# Patient Record
Sex: FEMALE | Race: WHITE | NOT HISPANIC OR LATINO | Employment: FULL TIME | URBAN - NONMETROPOLITAN AREA
[De-identification: names, ages, dates, MRNs, and addresses within clinical notes are randomized per-mention and may not be internally consistent; named-entity substitution may affect disease eponyms.]

---

## 2018-09-25 ENCOUNTER — HISTORICAL (OUTPATIENT)
Dept: ADMINISTRATIVE | Facility: HOSPITAL | Age: 43
End: 2018-09-25

## 2018-12-03 ENCOUNTER — HISTORICAL (OUTPATIENT)
Dept: ADMINISTRATIVE | Facility: HOSPITAL | Age: 43
End: 2018-12-03

## 2019-05-31 ENCOUNTER — HISTORICAL (OUTPATIENT)
Dept: ADMINISTRATIVE | Facility: HOSPITAL | Age: 44
End: 2019-05-31

## 2019-09-13 ENCOUNTER — HISTORICAL (OUTPATIENT)
Dept: ADMINISTRATIVE | Facility: HOSPITAL | Age: 44
End: 2019-09-13

## 2020-10-14 ENCOUNTER — HISTORICAL (OUTPATIENT)
Dept: ADMINISTRATIVE | Facility: HOSPITAL | Age: 45
End: 2020-10-14

## 2021-05-19 LAB
CHOLEST SERPL-MCNC: 180 MG/DL (ref 0–200)
EST. AVERAGE GLUCOSE BLD GHB EST-MCNC: 105 MG/DL (ref 70–115)
FOLATE SERPL-MCNC: 3.88 NG/ML (ref 2.76–20)
HBA1C MFR BLD: 5.5 % (ref 4–6)
HDLC SERPL-MCNC: 50 MG/DL (ref 40–60)
LDLC SERPL CALC-MCNC: 98.7 MG/DL (ref 30–100)
TRIGL SERPL-MCNC: 87 MG/DL (ref 30–200)
VIT B12 SERPL-MCNC: 276 PG/ML (ref 211–946)

## 2021-06-07 ENCOUNTER — HISTORICAL (OUTPATIENT)
Dept: ADMINISTRATIVE | Facility: HOSPITAL | Age: 46
End: 2021-06-07

## 2021-12-15 LAB
ALBUMIN SERPL-MCNC: 4.2 G/DL (ref 3.4–5)
ALBUMIN/GLOB SERPL: 2 {RATIO}
ALP SERPL-CCNC: 57 U/L (ref 50–144)
ALT SERPL-CCNC: 13 U/L (ref 1–45)
ANION GAP SERPL CALC-SCNC: 2 MMOL/L (ref 2–13)
AST SERPL-CCNC: 18 U/L (ref 14–36)
BASOPHILS # BLD AUTO: 0.05 X10(3)/MCL (ref 0.01–0.08)
BASOPHILS NFR BLD AUTO: 0.7 % (ref 0.1–1.2)
BILIRUB SERPL-MCNC: 0.49 MG/DL (ref 0–1)
BUN SERPL-MCNC: 17 MG/DL (ref 7–20)
CALCIUM SERPL-MCNC: 9.3 MG/DL (ref 8.4–10.2)
CHLORIDE SERPL-SCNC: 106 MMOL/L (ref 94–110)
CO2 SERPL-SCNC: 27 MMOL/L (ref 21–32)
CREAT SERPL-MCNC: 1.06 MG/DL (ref 0.52–1.04)
CREAT/UREA NIT SERPL: 16 (ref 12–20)
EOSINOPHIL # BLD AUTO: 0.09 X10(3)/MCL (ref 0.04–0.36)
EOSINOPHIL NFR BLD AUTO: 1.2 % (ref 0.7–7)
ERYTHROCYTE [DISTWIDTH] IN BLOOD BY AUTOMATED COUNT: 12.2 % (ref 11–14.5)
GLOBULIN SER-MCNC: 2.1 G/DL (ref 2–3.9)
GLUCOSE SERPL-MCNC: 89 MGM./DL (ref 70–115)
HCT VFR BLD AUTO: 40 % (ref 36–48)
HGB BLD-MCNC: 13.5 G/DL (ref 11.8–16)
IMM GRANULOCYTES # BLD AUTO: 0.01 X10E3/UL (ref 0–0.03)
IMM GRANULOCYTES NFR BLD AUTO: 0.1 % (ref 0–0.5)
LYMPHOCYTES # BLD AUTO: 2.57 X10(3)/MCL (ref 1.16–3.74)
LYMPHOCYTES NFR BLD AUTO: 35.4 % (ref 20–55)
MCH RBC QN AUTO: 30.4 PG (ref 27–34)
MCHC RBC AUTO-ENTMCNC: 33.8 G/DL (ref 31–37)
MCV RBC AUTO: 90.1 FL (ref 79–99)
MONOCYTES # BLD AUTO: 0.41 X10(3)/MCL (ref 0.24–0.36)
MONOCYTES NFR BLD AUTO: 5.6 % (ref 4.7–12.5)
NEUTROPHILS # BLD AUTO: 4.14 X10(3)/MCL (ref 1.56–6.13)
NEUTROPHILS NFR BLD AUTO: 57 % (ref 37–73)
PLATELET # BLD AUTO: 192 X10(3)/MCL (ref 140–371)
PMV BLD AUTO: 12.2 FL (ref 9.4–12.4)
POTASSIUM SERPL-SCNC: 4.6 MMOL/L (ref 3.5–5.1)
PROT SERPL-MCNC: 6.3 G/DL (ref 6.3–8.2)
RBC # BLD AUTO: 4.44 X10(6)/MCL (ref 4–5.1)
SODIUM SERPL-SCNC: 135 MMOL/L (ref 135–145)
WBC # SPEC AUTO: 7.3 X10(3)/MCL (ref 4–11.5)

## 2022-04-10 ENCOUNTER — HISTORICAL (OUTPATIENT)
Dept: ADMINISTRATIVE | Facility: HOSPITAL | Age: 47
End: 2022-04-10

## 2022-04-26 VITALS
DIASTOLIC BLOOD PRESSURE: 80 MMHG | SYSTOLIC BLOOD PRESSURE: 130 MMHG | HEIGHT: 60 IN | WEIGHT: 254 LBS | BODY MASS INDEX: 49.87 KG/M2 | OXYGEN SATURATION: 99 %

## 2022-05-02 ENCOUNTER — HISTORICAL (OUTPATIENT)
Dept: ADMINISTRATIVE | Facility: HOSPITAL | Age: 47
End: 2022-05-02

## 2022-08-22 ENCOUNTER — HISTORICAL (OUTPATIENT)
Dept: ADMINISTRATIVE | Facility: HOSPITAL | Age: 47
End: 2022-08-22

## 2022-11-07 LAB
HUMAN PAPILLOMAVIRUS (HPV): NORMAL
PAP RECOMMENDATION EXT: NORMAL
PAP SMEAR: NORMAL

## 2022-12-07 LAB — NONINV COLON CA DNA+OCC BLD SCRN STL QL: NEGATIVE

## 2023-02-02 ENCOUNTER — DOCUMENTATION ONLY (OUTPATIENT)
Dept: ADMINISTRATIVE | Facility: HOSPITAL | Age: 48
End: 2023-02-02
Payer: MEDICAID

## 2023-06-19 ENCOUNTER — OFFICE VISIT (OUTPATIENT)
Dept: FAMILY MEDICINE | Facility: CLINIC | Age: 48
End: 2023-06-19
Payer: MEDICAID

## 2023-06-19 VITALS
HEIGHT: 60 IN | WEIGHT: 182.75 LBS | BODY MASS INDEX: 35.88 KG/M2 | HEART RATE: 68 BPM | SYSTOLIC BLOOD PRESSURE: 100 MMHG | TEMPERATURE: 99 F | OXYGEN SATURATION: 96 % | DIASTOLIC BLOOD PRESSURE: 62 MMHG

## 2023-06-19 DIAGNOSIS — M54.2 NECK PAIN: Primary | ICD-10-CM

## 2023-06-19 DIAGNOSIS — N62 LARGE BREASTS: ICD-10-CM

## 2023-06-19 DIAGNOSIS — M62.838 TRAPEZIUS MUSCLE SPASM: ICD-10-CM

## 2023-06-19 PROBLEM — F32.A DEPRESSIVE DISORDER: Status: ACTIVE | Noted: 2023-06-19

## 2023-06-19 PROBLEM — G25.81 RESTLESS LEGS SYNDROME: Status: ACTIVE | Noted: 2023-06-19

## 2023-06-19 PROBLEM — F41.1 GENERALIZED ANXIETY DISORDER: Status: ACTIVE | Noted: 2023-06-19

## 2023-06-19 PROBLEM — E66.01 MORBID OBESITY: Status: ACTIVE | Noted: 2023-06-19

## 2023-06-19 PROBLEM — F33.2 SEVERE EPISODE OF RECURRENT MAJOR DEPRESSIVE DISORDER: Status: ACTIVE | Noted: 2023-06-19

## 2023-06-19 PROBLEM — G47.30 SLEEP APNEA: Status: ACTIVE | Noted: 2023-06-19

## 2023-06-19 PROBLEM — M17.11 OSTEOARTHRITIS OF RIGHT KNEE: Status: ACTIVE | Noted: 2023-06-19

## 2023-06-19 PROBLEM — Z00.01 ABNORMAL PHYSICAL EVALUATION: Status: ACTIVE | Noted: 2023-06-19

## 2023-06-19 PROBLEM — J30.2 SEASONAL ALLERGIES: Status: ACTIVE | Noted: 2023-06-19

## 2023-06-19 PROCEDURE — 3008F PR BODY MASS INDEX (BMI) DOCUMENTED: ICD-10-PCS | Mod: CPTII,,, | Performed by: NURSE PRACTITIONER

## 2023-06-19 PROCEDURE — 99214 PR OFFICE/OUTPT VISIT, EST, LEVL IV, 30-39 MIN: ICD-10-PCS | Mod: ,,, | Performed by: NURSE PRACTITIONER

## 2023-06-19 PROCEDURE — 3074F PR MOST RECENT SYSTOLIC BLOOD PRESSURE < 130 MM HG: ICD-10-PCS | Mod: CPTII,,, | Performed by: NURSE PRACTITIONER

## 2023-06-19 PROCEDURE — 3074F SYST BP LT 130 MM HG: CPT | Mod: CPTII,,, | Performed by: NURSE PRACTITIONER

## 2023-06-19 PROCEDURE — 3008F BODY MASS INDEX DOCD: CPT | Mod: CPTII,,, | Performed by: NURSE PRACTITIONER

## 2023-06-19 PROCEDURE — 3078F DIAST BP <80 MM HG: CPT | Mod: CPTII,,, | Performed by: NURSE PRACTITIONER

## 2023-06-19 PROCEDURE — 1159F MED LIST DOCD IN RCRD: CPT | Mod: CPTII,,, | Performed by: NURSE PRACTITIONER

## 2023-06-19 PROCEDURE — 3078F PR MOST RECENT DIASTOLIC BLOOD PRESSURE < 80 MM HG: ICD-10-PCS | Mod: CPTII,,, | Performed by: NURSE PRACTITIONER

## 2023-06-19 PROCEDURE — 99214 OFFICE O/P EST MOD 30 MIN: CPT | Mod: ,,, | Performed by: NURSE PRACTITIONER

## 2023-06-19 PROCEDURE — 1159F PR MEDICATION LIST DOCUMENTED IN MEDICAL RECORD: ICD-10-PCS | Mod: CPTII,,, | Performed by: NURSE PRACTITIONER

## 2023-06-19 RX ORDER — ATOMOXETINE 100 MG/1
100 CAPSULE ORAL DAILY
COMMUNITY
Start: 2023-05-30 | End: 2023-10-17 | Stop reason: SDUPTHER

## 2023-06-19 RX ORDER — ALBUTEROL SULFATE 90 UG/1
2 AEROSOL, METERED RESPIRATORY (INHALATION) EVERY 4 HOURS PRN
COMMUNITY
Start: 2023-05-30

## 2023-06-19 RX ORDER — CETIRIZINE HYDROCHLORIDE 10 MG/1
10 TABLET ORAL DAILY
COMMUNITY
Start: 2023-05-30

## 2023-06-19 RX ORDER — PRAMIPEXOLE DIHYDROCHLORIDE 0.5 MG/1
0.5 TABLET ORAL DAILY
COMMUNITY
Start: 2023-05-30 | End: 2024-01-25

## 2023-06-19 RX ORDER — DIVALPROEX SODIUM 500 MG/1
500 TABLET, FILM COATED, EXTENDED RELEASE ORAL 2 TIMES DAILY
COMMUNITY
Start: 2023-05-30 | End: 2023-12-12 | Stop reason: SDUPTHER

## 2023-06-19 RX ORDER — LUMATEPERONE 42 MG/1
1 CAPSULE ORAL DAILY
COMMUNITY
Start: 2023-05-30 | End: 2023-12-12

## 2023-06-19 RX ORDER — BUSPIRONE HYDROCHLORIDE 10 MG/1
10 TABLET ORAL 3 TIMES DAILY
COMMUNITY
Start: 2023-05-30 | End: 2023-12-12 | Stop reason: SDUPTHER

## 2023-06-19 RX ORDER — MONTELUKAST SODIUM 10 MG/1
10 TABLET ORAL DAILY
COMMUNITY
Start: 2023-05-30

## 2023-06-19 RX ORDER — DICLOFENAC SODIUM 75 MG/1
75 TABLET, DELAYED RELEASE ORAL 2 TIMES DAILY
COMMUNITY
Start: 2023-05-30 | End: 2023-09-20

## 2023-06-19 RX ORDER — TIZANIDINE 4 MG/1
4 TABLET ORAL EVERY 8 HOURS PRN
Qty: 30 TABLET | Refills: 0 | Status: SHIPPED | OUTPATIENT
Start: 2023-06-19 | End: 2023-06-29

## 2023-06-19 RX ORDER — DICLOFENAC SODIUM 10 MG/G
2 GEL TOPICAL 4 TIMES DAILY PRN
COMMUNITY
Start: 2023-05-30

## 2023-06-19 RX ORDER — BETAMETHASONE SODIUM PHOSPHATE AND BETAMETHASONE ACETATE 3; 3 MG/ML; MG/ML
6 INJECTION, SUSPENSION INTRA-ARTICULAR; INTRALESIONAL; INTRAMUSCULAR; SOFT TISSUE
Status: COMPLETED | OUTPATIENT
Start: 2023-06-19 | End: 2023-06-19

## 2023-06-19 RX ORDER — FLUTICASONE PROPIONATE 50 MCG
1 SPRAY, SUSPENSION (ML) NASAL 2 TIMES DAILY
COMMUNITY
Start: 2023-05-30

## 2023-06-19 RX ADMIN — BETAMETHASONE SODIUM PHOSPHATE AND BETAMETHASONE ACETATE 6 MG: 3; 3 INJECTION, SUSPENSION INTRA-ARTICULAR; INTRALESIONAL; INTRAMUSCULAR; SOFT TISSUE at 01:06

## 2023-06-19 NOTE — PROGRESS NOTES
Patient ID: Gema Eugene  : 1975     Chief Complaint: Neck Pain and Shoulder Pain    Allergies: Patient is allergic to grass pollen.     History of Present Illness:  The patient is a 48 y.o. White female who presents to clinic for evaluation and management with a chief complaint of Neck Pain and Shoulder Pain   Neck Pain   This is a recurrent problem. The current episode started more than 1 year ago. The problem has been gradually worsening. The pain is associated with nothing. The pain is present in the midline. The quality of the pain is described as aching, burning, cramping, shooting and stabbing. The pain is at a severity of 9/10. The pain is severe. The symptoms are aggravated by position and twisting. Stiffness is present All day. Pertinent negatives include no chest pain, leg pain, pain with swallowing, paresis, photophobia, syncope, trouble swallowing, visual change, weakness or weight loss. She has tried NSAIDs for the symptoms. The treatment provided mild relief.      Past Medical History:  has no past medical history on file.    Social History:  reports that she has been smoking cigarettes. She has been smoking an average of .5 packs per day. She has been exposed to tobacco smoke. She has never used smokeless tobacco.    Care Team: Patient Care Team:  JOSE Frye as PCP - General (Family Medicine)  Cathy Gold NP (Psychiatry)     Current Medications:  Current Outpatient Medications   Medication Instructions    albuterol (PROVENTIL/VENTOLIN HFA) 90 mcg/actuation inhaler 2 puffs, Inhalation, Every 4 hours PRN    atomoxetine (STRATTERA) 100 mg, Oral, Daily    busPIRone (BUSPAR) 10 mg, Oral, 3 times daily    CAPLYTA 42 mg Cap 1 capsule, Oral, Daily    cetirizine (ZYRTEC) 10 mg, Oral, Daily    diclofenac (VOLTAREN) 75 mg, Oral, 2 times daily    diclofenac sodium (VOLTAREN) 2 g, Topical (Top), 4 times daily PRN    divalproex 500 mg, Oral, 2 times daily    fluticasone propionate  (FLONASE) 50 mcg/actuation nasal spray 1 spray, Each Nostril, 2 times daily    montelukast (SINGULAIR) 10 mg, Oral, Daily    pramipexole (MIRAPEX) 0.5 mg, Oral, Daily       Review of Systems   Constitutional:  Negative for weight loss.   HENT:  Negative for trouble swallowing.    Eyes:  Negative for photophobia.   Cardiovascular:  Negative for chest pain and syncope.   Musculoskeletal:  Positive for neck pain. Negative for leg pain.   Neurological:  Negative for weakness.      Visit Vitals  /62 (BP Location: Right arm, Patient Position: Sitting)   Pulse 68   Temp 98.6 °F (37 °C) (Temporal)   Ht 5' (1.524 m)   Wt 82.9 kg (182 lb 12.2 oz)   LMP 06/13/2023   SpO2 96%   BMI 35.69 kg/m²       Physical Exam  Vitals and nursing note reviewed.   Constitutional:       Appearance: Normal appearance. She is obese.   HENT:      Head: Normocephalic and atraumatic.      Nose: Nose normal.      Mouth/Throat:      Mouth: Mucous membranes are moist.      Pharynx: Oropharynx is clear.   Eyes:      Extraocular Movements: Extraocular movements intact.      Conjunctiva/sclera: Conjunctivae normal.      Pupils: Pupils are equal, round, and reactive to light.   Cardiovascular:      Rate and Rhythm: Normal rate and regular rhythm.      Pulses: Normal pulses.      Heart sounds: No murmur heard.  Pulmonary:      Effort: Pulmonary effort is normal.      Breath sounds: Normal breath sounds.   Musculoskeletal:         General: Tenderness present. No swelling. Normal range of motion.      Cervical back: Normal range of motion and neck supple. Spasms and tenderness (negative spurlings) present. No swelling, edema, deformity, erythema, signs of trauma, lacerations, rigidity, torticollis, bony tenderness or crepitus. No pain with movement. Normal range of motion.   Skin:     General: Skin is warm and dry.      Capillary Refill: Capillary refill takes less than 2 seconds.   Neurological:      General: No focal deficit present.      Mental  Status: She is alert and oriented to person, place, and time.   Psychiatric:         Mood and Affect: Mood normal.         Judgment: Judgment normal.      Assessment & Plan:  1. Neck pain  Overview:  10/14/2020 cervical spine xray showed mild degenerative findings    Assessment & Plan:  Educated on need to avoid taking more than one NSAID at a time. May take 2 extra strength Tylenol TID as needed for pain. Educated that muscle relaxants can cause drowsiness. Patient states understanding. Will notify of xray results when available. Handouts for stretching exercises provided.      Orders:  -     X-Ray Cervical Spine 2 or 3 Views; Future; Expected date: 06/19/2023  -     tiZANidine (ZANAFLEX) 4 MG tablet; Take 1 tablet (4 mg total) by mouth every 8 (eight) hours as needed (muscle pain).  Dispense: 30 tablet; Refill: 0  -     betamethasone acetate-betamethasone sodium phosphate injection 6 mg    2. Large breasts  Assessment & Plan:  Has lost weight, down to 44DD, still interested in breast reduction to help with neck pains      3. Trapezius muscle spasm  -     betamethasone acetate-betamethasone sodium phosphate injection 6 mg         Future Appointments   Date Time Provider Department Center   8/18/2023  8:50 AM ROBERT, [facility] AMADEO Saucedo   8/21/2023  2:00 PM JOSE Frye       Follow up if symptoms worsen or fail to improve, for Keep Apt as Scheduled. Call sooner if needed.    JOSE FRYE

## 2023-06-19 NOTE — ASSESSMENT & PLAN NOTE
Educated on need to avoid taking more than one NSAID at a time. May take 2 extra strength Tylenol TID as needed for pain. Educated that muscle relaxants can cause drowsiness. Patient states understanding. Will notify of xray results when available. Handouts for stretching exercises provided.

## 2023-09-18 ENCOUNTER — TELEPHONE (OUTPATIENT)
Dept: FAMILY MEDICINE | Facility: CLINIC | Age: 48
End: 2023-09-18
Payer: MEDICAID

## 2023-09-18 PROBLEM — Z00.01 ABNORMAL PHYSICAL EVALUATION: Status: RESOLVED | Noted: 2023-06-19 | Resolved: 2023-09-18

## 2023-09-20 DIAGNOSIS — M17.11 OSTEOARTHRITIS OF RIGHT KNEE, UNSPECIFIED OSTEOARTHRITIS TYPE: Primary | ICD-10-CM

## 2023-09-20 RX ORDER — DICLOFENAC SODIUM 75 MG/1
TABLET, DELAYED RELEASE ORAL
Qty: 60 TABLET | Refills: 11 | Status: SHIPPED | OUTPATIENT
Start: 2023-09-20 | End: 2024-01-18

## 2023-10-17 ENCOUNTER — HOSPITAL ENCOUNTER (OUTPATIENT)
Dept: RADIOLOGY | Facility: HOSPITAL | Age: 48
Discharge: HOME | End: 2023-10-17
Attending: NURSE PRACTITIONER
Payer: MEDICAID

## 2023-10-17 ENCOUNTER — PATIENT MESSAGE (OUTPATIENT)
Dept: FAMILY MEDICINE | Facility: CLINIC | Age: 48
End: 2023-10-17

## 2023-10-17 ENCOUNTER — OFFICE VISIT (OUTPATIENT)
Dept: FAMILY MEDICINE | Facility: CLINIC | Age: 48
End: 2023-10-17
Payer: MEDICAID

## 2023-10-17 VITALS
HEART RATE: 86 BPM | BODY MASS INDEX: 34.68 KG/M2 | DIASTOLIC BLOOD PRESSURE: 60 MMHG | WEIGHT: 176.63 LBS | HEIGHT: 60 IN | TEMPERATURE: 98 F | SYSTOLIC BLOOD PRESSURE: 118 MMHG | OXYGEN SATURATION: 98 %

## 2023-10-17 DIAGNOSIS — Z23 IMMUNIZATION DUE: ICD-10-CM

## 2023-10-17 DIAGNOSIS — M54.2 NECK PAIN: ICD-10-CM

## 2023-10-17 DIAGNOSIS — M25.511 ACUTE PAIN OF RIGHT SHOULDER: Primary | ICD-10-CM

## 2023-10-17 DIAGNOSIS — R41.840 DIFFICULTY CONCENTRATING: ICD-10-CM

## 2023-10-17 DIAGNOSIS — Z12.31 SCREENING MAMMOGRAM FOR BREAST CANCER: ICD-10-CM

## 2023-10-17 PROCEDURE — 1159F PR MEDICATION LIST DOCUMENTED IN MEDICAL RECORD: ICD-10-PCS | Mod: CPTII,,, | Performed by: NURSE PRACTITIONER

## 2023-10-17 PROCEDURE — 90686 FLU VACCINE (QUAD) GREATER THAN OR EQUAL TO 3YO PRESERVATIVE FREE IM: ICD-10-PCS | Mod: ,,, | Performed by: NURSE PRACTITIONER

## 2023-10-17 PROCEDURE — 73030 X-RAY EXAM OF SHOULDER: CPT | Mod: TC,RT

## 2023-10-17 PROCEDURE — 3008F BODY MASS INDEX DOCD: CPT | Mod: CPTII,,, | Performed by: NURSE PRACTITIONER

## 2023-10-17 PROCEDURE — 1160F PR REVIEW ALL MEDS BY PRESCRIBER/CLIN PHARMACIST DOCUMENTED: ICD-10-PCS | Mod: CPTII,,, | Performed by: NURSE PRACTITIONER

## 2023-10-17 PROCEDURE — 90471 IMMUNIZATION ADMIN: CPT | Mod: ,,, | Performed by: NURSE PRACTITIONER

## 2023-10-17 PROCEDURE — 3074F SYST BP LT 130 MM HG: CPT | Mod: CPTII,,, | Performed by: NURSE PRACTITIONER

## 2023-10-17 PROCEDURE — 90471 FLU VACCINE (QUAD) GREATER THAN OR EQUAL TO 3YO PRESERVATIVE FREE IM: ICD-10-PCS | Mod: ,,, | Performed by: NURSE PRACTITIONER

## 2023-10-17 PROCEDURE — 99214 PR OFFICE/OUTPT VISIT, EST, LEVL IV, 30-39 MIN: ICD-10-PCS | Mod: 25,,, | Performed by: NURSE PRACTITIONER

## 2023-10-17 PROCEDURE — 3008F PR BODY MASS INDEX (BMI) DOCUMENTED: ICD-10-PCS | Mod: CPTII,,, | Performed by: NURSE PRACTITIONER

## 2023-10-17 PROCEDURE — 3078F PR MOST RECENT DIASTOLIC BLOOD PRESSURE < 80 MM HG: ICD-10-PCS | Mod: CPTII,,, | Performed by: NURSE PRACTITIONER

## 2023-10-17 PROCEDURE — 1160F RVW MEDS BY RX/DR IN RCRD: CPT | Mod: CPTII,,, | Performed by: NURSE PRACTITIONER

## 2023-10-17 PROCEDURE — 99214 OFFICE O/P EST MOD 30 MIN: CPT | Mod: 25,,, | Performed by: NURSE PRACTITIONER

## 2023-10-17 PROCEDURE — 3078F DIAST BP <80 MM HG: CPT | Mod: CPTII,,, | Performed by: NURSE PRACTITIONER

## 2023-10-17 PROCEDURE — 72040 X-RAY EXAM NECK SPINE 2-3 VW: CPT | Mod: TC

## 2023-10-17 PROCEDURE — 3074F PR MOST RECENT SYSTOLIC BLOOD PRESSURE < 130 MM HG: ICD-10-PCS | Mod: CPTII,,, | Performed by: NURSE PRACTITIONER

## 2023-10-17 PROCEDURE — 1159F MED LIST DOCD IN RCRD: CPT | Mod: CPTII,,, | Performed by: NURSE PRACTITIONER

## 2023-10-17 PROCEDURE — 90686 IIV4 VACC NO PRSV 0.5 ML IM: CPT | Mod: ,,, | Performed by: NURSE PRACTITIONER

## 2023-10-17 RX ORDER — ATOMOXETINE 100 MG/1
100 CAPSULE ORAL DAILY
Qty: 30 CAPSULE | Refills: 0 | Status: SHIPPED | OUTPATIENT
Start: 2023-10-17 | End: 2023-12-12 | Stop reason: SDUPTHER

## 2023-10-17 NOTE — ASSESSMENT & PLAN NOTE
Schedule next available new patient apt with Rod per patient request d/t difficulty getting to . Refill strattera for now

## 2023-10-17 NOTE — PROGRESS NOTES
Patient ID: Gema Eugene  : 1975     Chief Complaint: Shoulder Pain (Rt shoulder pain, congestion)    Allergies: Patient is allergic to grass pollen.     History of Present Illness:  The patient is a 48 y.o. White female who presents to clinic for evaluation and management with a chief complaint of Shoulder Pain (Rt shoulder pain, congestion)   Neck Pain   This is a recurrent problem. The current episode started more than 1 year ago. The problem has been gradually worsening. The pain is associated with nothing. The pain is present in the midline. The quality of the pain is described as aching, burning, cramping, shooting and stabbing. The pain is at a severity of 9/10. The pain is severe. The symptoms are aggravated by position and twisting. Stiffness is present All day. Pertinent negatives include no chest pain, leg pain, pain with swallowing, paresis, photophobia, syncope, trouble swallowing, visual change, weakness or weight loss. She has tried NSAIDs for the symptoms. The treatment provided mild relief.       Shoulder Pain   The pain is present in the right shoulder, right arm and neck. This is a recurrent problem. The current episode started more than 1 month ago. There has been a history of trauma. The problem occurs constantly. The problem has been gradually worsening. The quality of the pain is described as burning and aching. The pain is at a severity of 9/10. Associated symptoms include a limited range of motion, numbness, stiffness and tingling. Pertinent negatives include no fever, headaches, inability to bear weight, itching, joint locking, joint swelling or visual symptoms. The symptoms are aggravated by cold. She has tried NSAIDS and rest for the symptoms. The treatment provided mild relief.   Neck Pain   This is a recurrent problem. The current episode started more than 1 year ago. The problem has been gradually worsening. The pain is associated with nothing. The pain is present in the  midline. The quality of the pain is described as aching, burning, cramping, shooting and stabbing. The pain is at a severity of 9/10. The pain is severe. The symptoms are aggravated by position and twisting. Stiffness is present All day. Associated symptoms include numbness and tingling. Pertinent negatives include no chest pain, fever, headaches, leg pain, pain with swallowing, paresis, photophobia, syncope, trouble swallowing, visual change, weakness or weight loss. She has tried NSAIDs for the symptoms. The treatment provided mild relief.        Past Medical History:  has a past medical history of Arthritis.    Social History:  reports that she has been smoking cigarettes. She has been exposed to tobacco smoke. She has never used smokeless tobacco. She reports that she does not currently use alcohol. She reports current drug use. Drug: Marijuana.    Care Team: Patient Care Team:  Avinash Salazar APRN as PCP - General (Family Medicine)  Cathy Gold, NP (Psychiatry)     Current Medications:  Current Outpatient Medications   Medication Instructions    albuterol (PROVENTIL/VENTOLIN HFA) 90 mcg/actuation inhaler 2 puffs, Inhalation, Every 4 hours PRN    atomoxetine (STRATTERA) 100 mg, Oral, Daily    busPIRone (BUSPAR) 10 mg, Oral, 3 times daily    CAPLYTA 42 mg Cap 1 capsule, Oral, Daily    cetirizine (ZYRTEC) 10 mg, Oral, Daily    diclofenac (VOLTAREN) 75 MG EC tablet TAKE ONE(1) TAB BY MOUTH TWICE DAILY WITH FOOD FOR PAIN & INFLAMMATION    diclofenac sodium (VOLTAREN) 2 g, Topical (Top), 4 times daily PRN    divalproex 500 mg, Oral, 2 times daily    fluticasone propionate (FLONASE) 50 mcg/actuation nasal spray 1 spray, Each Nostril, 2 times daily    montelukast (SINGULAIR) 10 mg, Oral, Daily    pramipexole (MIRAPEX) 0.5 mg, Oral, Daily       Review of Systems   Constitutional:  Negative for fever and weight loss.   HENT:  Negative for mouth sores, sore throat and trouble swallowing.    Eyes:  Negative for  photophobia.   Respiratory:  Negative for shortness of breath.    Cardiovascular:  Negative for chest pain and syncope.   Musculoskeletal:  Positive for arthralgias, neck pain and stiffness. Negative for leg pain.   Integumentary:  Negative for itching.   Neurological:  Positive for tingling and numbness. Negative for weakness and headaches.   Psychiatric/Behavioral:  Positive for decreased concentration.         Visit Vitals  /60 (BP Location: Left arm)   Pulse 86   Temp 98 °F (36.7 °C) (Oral)   Ht 5' (1.524 m)   Wt 80.1 kg (176 lb 9.6 oz)   LMP 09/17/2023   SpO2 98%   BMI 34.49 kg/m²       Physical Exam  Vitals and nursing note reviewed.   Constitutional:       Appearance: Normal appearance. She is obese.   HENT:      Head: Normocephalic and atraumatic.      Nose: Nose normal.      Mouth/Throat:      Mouth: Mucous membranes are moist.      Pharynx: Oropharynx is clear.   Eyes:      Extraocular Movements: Extraocular movements intact.      Conjunctiva/sclera: Conjunctivae normal.      Pupils: Pupils are equal, round, and reactive to light.   Cardiovascular:      Rate and Rhythm: Normal rate and regular rhythm.      Pulses: Normal pulses.      Heart sounds: No murmur heard.  Pulmonary:      Effort: Pulmonary effort is normal.      Breath sounds: Normal breath sounds.   Musculoskeletal:         General: Tenderness present. No swelling.      Right shoulder: Tenderness, bony tenderness and crepitus present. No swelling or deformity. Decreased range of motion.      Cervical back: Normal range of motion and neck supple. Spasms and tenderness (negative spurlings, + tenderness with palpation of trapezius) present. No swelling, edema, deformity, erythema, signs of trauma, lacerations, rigidity, torticollis, bony tenderness or crepitus. No pain with movement. Normal range of motion.      Comments: + empty can produces pain and weakness, + lift off.    Skin:     General: Skin is warm and dry.      Capillary Refill:  Capillary refill takes less than 2 seconds.   Neurological:      General: No focal deficit present.      Mental Status: She is alert and oriented to person, place, and time.   Psychiatric:         Mood and Affect: Mood normal.         Judgment: Judgment normal.          Labs Reviewed:  Chemistry:  Lab Results   Component Value Date     12/15/2021    K 4.6 12/15/2021    CHLORIDE 106 12/15/2021    BUN 17 12/15/2021    CREATININE 1.06 (H) 12/15/2021    GLUCOSE 89 12/15/2021    CALCIUM 9.3 12/15/2021    ALKPHOS 57 12/15/2021    LABPROT 6.3 12/15/2021    ALBUMIN 4.2 12/15/2021    AST 18 12/15/2021    ALT 13 12/15/2021        Lab Results   Component Value Date    HGBA1C 5.5 05/19/2021        Hematology:  Lab Results   Component Value Date    WBC 7.3 12/15/2021    RBC 4.44 12/15/2021    HGB 13.5 12/15/2021    HCT 40.0 12/15/2021    MCV 90.1 12/15/2021    MCH 30.4 12/15/2021    MCHC 33.8 12/15/2021    RDW 12.2 (H) 12/15/2021     12/15/2021    MPV 12.2 12/15/2021       Lipid Panel:  Lab Results   Component Value Date    CHOL 180 05/19/2021    HDL 50 05/19/2021    TRIG 87 05/19/2021        Assessment & Plan:  1. Acute pain of right shoulder  -     X-ray Shoulder 2 or More Views Right; Future; Expected date: 10/17/2023  -     Ambulatory referral/consult to Physical/Occupational Therapy; Future; Expected date: 10/24/2023    2. Neck pain  Overview:  10/14/2020 cervical spine xray showed mild degenerative findings    Orders:  -     X-Ray Cervical Spine AP And Lateral; Future; Expected date: 10/17/2023  -     Ambulatory referral/consult to Physical/Occupational Therapy; Future; Expected date: 10/24/2023    3. Screening mammogram for breast cancer  -     Mammo Digital Screening Bilat w/ Michael; Future; Expected date: 10/17/2023    4. Immunization due  -     Influenza - Quadrivalent *Preferred* (6 months+) (PF)    5. Difficulty concentrating  Assessment & Plan:  Schedule next available new patient apt with Rod bland  patient request d/t difficulty getting to . Refill strattera for now    Orders:  -     atomoxetine (STRATTERA) 100 mg capsule; Take 1 capsule (100 mg total) by mouth Daily.  Dispense: 30 capsule; Refill: 0         Future Appointments   Date Time Provider Department Center   12/18/2023 10:00 AM Avinash Salazar APRN [facility] [place] [facility]       Follow up for 1) next available new pt Rod  2) needs lab apt prior to dec apt. Call sooner if needed.    JOSE GRANT    Lab Frequency Next Occurrence   X-Ray Cervical Spine 2 or 3 Views Once 06/19/2023   CBC Auto Differential Once 08/18/2023   Comprehensive Metabolic Panel Once 08/18/2023   Lipid Panel Once 08/18/2023   TSH Once 08/18/2023   Vitamin D Once 08/18/2023

## 2023-12-12 ENCOUNTER — OFFICE VISIT (OUTPATIENT)
Dept: BEHAVIORAL HEALTH | Facility: CLINIC | Age: 48
End: 2023-12-12
Payer: MEDICAID

## 2023-12-12 VITALS
OXYGEN SATURATION: 95 % | WEIGHT: 179.69 LBS | SYSTOLIC BLOOD PRESSURE: 122 MMHG | TEMPERATURE: 98 F | BODY MASS INDEX: 35.28 KG/M2 | HEART RATE: 88 BPM | DIASTOLIC BLOOD PRESSURE: 76 MMHG | HEIGHT: 60 IN

## 2023-12-12 DIAGNOSIS — F33.2 SEVERE EPISODE OF RECURRENT MAJOR DEPRESSIVE DISORDER, WITHOUT PSYCHOTIC FEATURES: ICD-10-CM

## 2023-12-12 DIAGNOSIS — F31.9 BIPOLAR AFFECTIVE DISORDER, REMISSION STATUS UNSPECIFIED: Primary | ICD-10-CM

## 2023-12-12 DIAGNOSIS — R41.840 DIFFICULTY CONCENTRATING: ICD-10-CM

## 2023-12-12 DIAGNOSIS — F12.90 MARIJUANA USE, CONTINUOUS: ICD-10-CM

## 2023-12-12 DIAGNOSIS — Z91.51 HISTORY OF SUICIDE ATTEMPT: ICD-10-CM

## 2023-12-12 DIAGNOSIS — F41.1 GENERALIZED ANXIETY DISORDER: ICD-10-CM

## 2023-12-12 DIAGNOSIS — F19.90 SUBSTANCE USE DISORDER: ICD-10-CM

## 2023-12-12 DIAGNOSIS — Z02.83 ENCOUNTER FOR DRUG SCREENING: ICD-10-CM

## 2023-12-12 DIAGNOSIS — F15.10 METHAMPHETAMINE ABUSE: ICD-10-CM

## 2023-12-12 DIAGNOSIS — Z86.59 HISTORY OF PSYCHIATRIC HOSPITALIZATION: ICD-10-CM

## 2023-12-12 LAB
AMP AMPHETAMINE 1000 NM/ML POC: ABNORMAL
BAR BARBITURATES 300 NG/ML POC: NEGATIVE
BUP BUPRENORPHINE 10 NG/ML POC: NEGATIVE
BZO BENZODIAZEPINES 300 NG/ML POC: NEGATIVE
COC COCAINE 300 NG/ML POC: NEGATIVE
CREATININE (CR) POC: ABNORMAL
CTP QC/QA: YES
MET METHAMPHETAMINE 1000 NG/ML POC: ABNORMAL
MOP/OPI300 MORPHINE 300 NG/ML POC: NEGATIVE
MTD METHADONE 300 NG/ML POC: NEGATIVE
OXIDANT (OX) POC: ABNORMAL
OXY OXYCODONE 100 NG/ML POC: NEGATIVE
SPECIFIC GRAVITY (SG) POC: ABNORMAL
TEMPERATURE (°F) POC: 96
THC MARIJUANA 50 NG/ML POC: ABNORMAL

## 2023-12-12 PROCEDURE — 99204 PR OFFICE/OUTPT VISIT, NEW, LEVL IV, 45-59 MIN: ICD-10-PCS | Mod: 25,,, | Performed by: NURSE PRACTITIONER

## 2023-12-12 PROCEDURE — 80305 POCT URINE DRUG SCREEN (WITH BUP): ICD-10-PCS | Mod: QW,,, | Performed by: NURSE PRACTITIONER

## 2023-12-12 PROCEDURE — 99204 OFFICE O/P NEW MOD 45 MIN: CPT | Mod: 25,,, | Performed by: NURSE PRACTITIONER

## 2023-12-12 PROCEDURE — 3078F PR MOST RECENT DIASTOLIC BLOOD PRESSURE < 80 MM HG: ICD-10-PCS | Mod: CPTII,,, | Performed by: NURSE PRACTITIONER

## 2023-12-12 PROCEDURE — 3078F DIAST BP <80 MM HG: CPT | Mod: CPTII,,, | Performed by: NURSE PRACTITIONER

## 2023-12-12 PROCEDURE — 3074F PR MOST RECENT SYSTOLIC BLOOD PRESSURE < 130 MM HG: ICD-10-PCS | Mod: CPTII,,, | Performed by: NURSE PRACTITIONER

## 2023-12-12 PROCEDURE — 1160F PR REVIEW ALL MEDS BY PRESCRIBER/CLIN PHARMACIST DOCUMENTED: ICD-10-PCS | Mod: CPTII,,, | Performed by: NURSE PRACTITIONER

## 2023-12-12 PROCEDURE — 80305 DRUG TEST PRSMV DIR OPT OBS: CPT | Mod: QW,,, | Performed by: NURSE PRACTITIONER

## 2023-12-12 PROCEDURE — 1160F RVW MEDS BY RX/DR IN RCRD: CPT | Mod: CPTII,,, | Performed by: NURSE PRACTITIONER

## 2023-12-12 PROCEDURE — 3008F BODY MASS INDEX DOCD: CPT | Mod: CPTII,,, | Performed by: NURSE PRACTITIONER

## 2023-12-12 PROCEDURE — 1159F MED LIST DOCD IN RCRD: CPT | Mod: CPTII,,, | Performed by: NURSE PRACTITIONER

## 2023-12-12 PROCEDURE — 3074F SYST BP LT 130 MM HG: CPT | Mod: CPTII,,, | Performed by: NURSE PRACTITIONER

## 2023-12-12 PROCEDURE — 3008F PR BODY MASS INDEX (BMI) DOCUMENTED: ICD-10-PCS | Mod: CPTII,,, | Performed by: NURSE PRACTITIONER

## 2023-12-12 PROCEDURE — 1159F PR MEDICATION LIST DOCUMENTED IN MEDICAL RECORD: ICD-10-PCS | Mod: CPTII,,, | Performed by: NURSE PRACTITIONER

## 2023-12-12 RX ORDER — ATOMOXETINE 100 MG/1
100 CAPSULE ORAL DAILY
Qty: 30 CAPSULE | Refills: 0 | Status: SHIPPED | OUTPATIENT
Start: 2023-12-12 | End: 2024-01-18 | Stop reason: SDUPTHER

## 2023-12-12 RX ORDER — BUSPIRONE HYDROCHLORIDE 10 MG/1
10 TABLET ORAL 3 TIMES DAILY
Qty: 90 TABLET | Refills: 0 | Status: SHIPPED | OUTPATIENT
Start: 2023-12-12 | End: 2024-01-18 | Stop reason: SDUPTHER

## 2023-12-12 RX ORDER — DIVALPROEX SODIUM 500 MG/1
500 TABLET, FILM COATED, EXTENDED RELEASE ORAL 2 TIMES DAILY
Qty: 60 TABLET | Refills: 0 | Status: SHIPPED | OUTPATIENT
Start: 2023-12-12
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
## 2024-01-18 ENCOUNTER — TELEPHONE (OUTPATIENT)
Dept: FAMILY MEDICINE | Facility: CLINIC | Age: 49
End: 2024-01-18

## 2024-01-18 ENCOUNTER — PATIENT MESSAGE (OUTPATIENT)
Dept: FAMILY MEDICINE | Facility: CLINIC | Age: 49
End: 2024-01-18

## 2024-01-18 ENCOUNTER — OFFICE VISIT (OUTPATIENT)
Dept: FAMILY MEDICINE | Facility: CLINIC | Age: 49
End: 2024-01-18
Payer: MEDICAID

## 2024-01-18 VITALS
OXYGEN SATURATION: 98 % | DIASTOLIC BLOOD PRESSURE: 74 MMHG | BODY MASS INDEX: 34.43 KG/M2 | HEART RATE: 88 BPM | WEIGHT: 175.38 LBS | HEIGHT: 60 IN | SYSTOLIC BLOOD PRESSURE: 122 MMHG | TEMPERATURE: 98 F

## 2024-01-18 DIAGNOSIS — M25.512 ACUTE PAIN OF LEFT SHOULDER: Primary | ICD-10-CM

## 2024-01-18 DIAGNOSIS — R41.840 DIFFICULTY CONCENTRATING: ICD-10-CM

## 2024-01-18 DIAGNOSIS — F41.1 GENERALIZED ANXIETY DISORDER: ICD-10-CM

## 2024-01-18 PROCEDURE — 3078F DIAST BP <80 MM HG: CPT | Mod: CPTII,,, | Performed by: NURSE PRACTITIONER

## 2024-01-18 PROCEDURE — 3008F BODY MASS INDEX DOCD: CPT | Mod: CPTII,,, | Performed by: NURSE PRACTITIONER

## 2024-01-18 PROCEDURE — 1159F MED LIST DOCD IN RCRD: CPT | Mod: CPTII,,, | Performed by: NURSE PRACTITIONER

## 2024-01-18 PROCEDURE — 99213 OFFICE O/P EST LOW 20 MIN: CPT | Mod: ,,, | Performed by: NURSE PRACTITIONER

## 2024-01-18 PROCEDURE — 1160F RVW MEDS BY RX/DR IN RCRD: CPT | Mod: CPTII,,, | Performed by: NURSE PRACTITIONER

## 2024-01-18 PROCEDURE — 3074F SYST BP LT 130 MM HG: CPT | Mod: CPTII,,, | Performed by: NURSE PRACTITIONER

## 2024-01-18 RX ORDER — BUSPIRONE HYDROCHLORIDE 10 MG/1
10 TABLET ORAL 3 TIMES DAILY
Qty: 90 TABLET | Refills: 0 | Status: SHIPPED | OUTPATIENT
Start: 2024-01-18 | End: 2024-05-07 | Stop reason: SDUPTHER

## 2024-01-18 RX ORDER — ATOMOXETINE 100 MG/1
100 CAPSULE ORAL DAILY
Qty: 30 CAPSULE | Refills: 0 | Status: SHIPPED | OUTPATIENT
Start: 2024-01-18 | End: 2024-05-07 | Stop reason: SDUPTHER

## 2024-01-18 RX ORDER — IBUPROFEN 800 MG/1
800 TABLET ORAL 3 TIMES DAILY
Qty: 90 TABLET | Refills: 1 | Status: SHIPPED | OUTPATIENT
Start: 2024-01-18 | End: 2024-04-06

## 2024-01-18 NOTE — PROGRESS NOTES
Patient ID: Gema Eugene  : 1975     Chief Complaint: Arthritis    Allergies: Patient is allergic to grass pollen.     History of Present Illness:  The patient is a 48 y.o. White female who presents to clinic for evaluation and management with a chief complaint of Arthritis   Right shoulder and neck pain began 3 years ago.  This is a recurrent problem. The current episode started more than 6 months ago. Xray of right shoulder showed mild osteoarthritis. Still awaiting apt for Physical therapy.     Now patient is c/o left shoulder pain. This began about 3 months ago. Located top of bicep and posterior shoulder. Worse with lifting above the head.     Arthritis  Presents for follow-up visit. She complains of pain and stiffness. The symptoms have been worsening. Affected locations include the right shoulder, left shoulder and neck. Associated symptoms include pain at night and pain while resting. Pertinent negatives include no diarrhea, dry eyes, dry mouth, dysuria, fatigue, fever, rash, Raynaud's syndrome, uveitis or weight loss.   Shoulder Pain   The pain is present in the right shoulder, right arm and neck. This is a recurrent problem. The current episode started more than 1 month ago. There has been a history of trauma. The problem occurs constantly. The problem has been gradually worsening. The quality of the pain is described as burning and aching. The pain is at a severity of 9/10. Associated symptoms include a limited range of motion, numbness, stiffness and tingling. Pertinent negatives include no fever, headaches, inability to bear weight, itching, joint locking, joint swelling or visual symptoms. The symptoms are aggravated by cold. She has tried NSAIDS and rest for the symptoms. The treatment provided mild relief.   Neck Pain   This is a recurrent problem. The current episode started more than 1 year ago. The problem has been gradually worsening. The pain is associated with nothing. The pain is  present in the midline. The quality of the pain is described as aching, burning, cramping, shooting and stabbing. The pain is at a severity of 9/10. The pain is severe. The symptoms are aggravated by position and twisting. Stiffness is present All day. Associated symptoms include numbness and tingling. Pertinent negatives include no chest pain, fever, headaches, leg pain, pain with swallowing, paresis, photophobia, syncope, trouble swallowing, visual change, weakness or weight loss. She has tried NSAIDs for the symptoms. The treatment provided mild relief.        Past Medical History:  has a past medical history of ADHD (attention deficit hyperactivity disorder), Anxiety, Arthritis, Bipolar disorder, and Depression.    Social History:  reports that she has been smoking cigarettes. She started smoking about 20 years ago. She has a 10.0 pack-year smoking history. She has been exposed to tobacco smoke. She has never used smokeless tobacco. She reports that she does not currently use alcohol. She reports current drug use. Drugs: Marijuana and Methamphetamines.    Care Team: Patient Care Team:  Avinash Salazar APRN as PCP - General (Family Medicine)  Cathy Gold, NP (Psychiatry)     Current Medications:  Current Outpatient Medications   Medication Instructions    albuterol (PROVENTIL/VENTOLIN HFA) 90 mcg/actuation inhaler 2 puffs, Inhalation, Every 4 hours PRN    atomoxetine (STRATTERA) 100 mg, Oral, Daily    busPIRone (BUSPAR) 10 mg, Oral, 3 times daily    cetirizine (ZYRTEC) 10 mg, Oral, Daily    diclofenac (VOLTAREN) 75 MG EC tablet TAKE ONE(1) TAB BY MOUTH TWICE DAILY WITH FOOD FOR PAIN & INFLAMMATION    diclofenac sodium (VOLTAREN) 2 g, Topical (Top), 4 times daily PRN    divalproex ER (DEPAKOTE ER) 500 mg, Oral, 2 times daily    fluticasone propionate (FLONASE) 50 mcg/actuation nasal spray 1 spray, Each Nostril, 2 times daily    montelukast (SINGULAIR) 10 mg, Oral, Daily    pramipexole (MIRAPEX) 0.5 mg,  Oral, Daily       Review of Systems   Constitutional:  Negative for fatigue, fever and weight loss.   HENT:  Negative for trouble swallowing.    Eyes:  Negative for photophobia.   Cardiovascular:  Negative for chest pain and syncope.   Gastrointestinal:  Negative for diarrhea.   Genitourinary:  Negative for dysuria.   Musculoskeletal:  Positive for arthritis, neck pain and stiffness. Negative for leg pain.   Integumentary:  Negative for itching and rash.   Neurological:  Positive for tingling and numbness. Negative for weakness and headaches.        Visit Vitals  /74 (BP Location: Right arm, Patient Position: Sitting, BP Method: Medium (Manual))   Pulse 88   Temp 97.9 °F (36.6 °C) (Temporal)   Ht 5' (1.524 m)   Wt 79.6 kg (175 lb 6.4 oz)   LMP 11/23/2023 (Approximate)   SpO2 98%   BMI 34.26 kg/m²       Physical Exam  Vitals and nursing note reviewed.   Constitutional:       Appearance: Normal appearance. She is obese.   HENT:      Head: Normocephalic and atraumatic.      Nose: Nose normal.      Mouth/Throat:      Mouth: Mucous membranes are moist.      Pharynx: Oropharynx is clear.   Eyes:      Extraocular Movements: Extraocular movements intact.      Conjunctiva/sclera: Conjunctivae normal.      Pupils: Pupils are equal, round, and reactive to light.   Cardiovascular:      Rate and Rhythm: Normal rate and regular rhythm.      Pulses: Normal pulses.      Heart sounds: No murmur heard.  Pulmonary:      Effort: Pulmonary effort is normal.      Breath sounds: Normal breath sounds.   Musculoskeletal:         General: Tenderness present. No swelling.      Left shoulder: Tenderness (with palpation of proximal bicep tendon) and bony tenderness present. No swelling, deformity or crepitus. Decreased range of motion.      Cervical back: Normal range of motion and neck supple.      Comments: (-) empty can, (-)  lift off, (-) drop arm   Skin:     General: Skin is warm and dry.      Capillary Refill: Capillary refill takes  less than 2 seconds.   Neurological:      General: No focal deficit present.      Mental Status: She is alert and oriented to person, place, and time.   Psychiatric:         Mood and Affect: Mood normal.         Judgment: Judgment normal.          Labs Reviewed:  Chemistry:  Lab Results   Component Value Date     12/15/2021    K 4.6 12/15/2021    CHLORIDE 106 12/15/2021    BUN 17 12/15/2021    CREATININE 1.06 (H) 12/15/2021    GLUCOSE 89 12/15/2021    CALCIUM 9.3 12/15/2021    ALKPHOS 57 12/15/2021    LABPROT 6.3 12/15/2021    ALBUMIN 4.2 12/15/2021    AST 18 12/15/2021    ALT 13 12/15/2021        Lab Results   Component Value Date    HGBA1C 5.5 05/19/2021        Hematology:  Lab Results   Component Value Date    WBC 7.3 12/15/2021    RBC 4.44 12/15/2021    HGB 13.5 12/15/2021    HCT 40.0 12/15/2021    MCV 90.1 12/15/2021    MCH 30.4 12/15/2021    MCHC 33.8 12/15/2021    RDW 12.2 (H) 12/15/2021     12/15/2021    MPV 12.2 12/15/2021       Lipid Panel:  Lab Results   Component Value Date    CHOL 180 05/19/2021    HDL 50 05/19/2021    TRIG 87 05/19/2021        Assessment & Plan:  1. Acute pain of left shoulder  Assessment & Plan:  Change diclofenac to ibuprofen. Advised on kt tape. Educated on need to avoid taking more than one NSAID at a time. May take 2 extra strength Tylenol TID as needed for pain.  Patient states understanding. Will notify of xray results when available. Handouts for stretching exercises provided.      Orders:  -     Ambulatory referral/consult to Physical/Occupational Therapy; Future; Expected date: 01/25/2024  -     X-Ray Shoulder 2 or More Views Left; Future; Expected date: 01/18/2024  -     ibuprofen (ADVIL,MOTRIN) 800 MG tablet; Take 1 tablet (800 mg total) by mouth 3 (three) times daily.  Dispense: 90 tablet; Refill: 1         Future Appointments   Date Time Provider Department Center   1/18/2024  8:30 AM Avinash Salazar APRN [facility]   1/23/2024 11:30 AM [place],  Graham REYES PMHNP [facility] Ethel Saucedo       No follow-ups on file. Call sooner if needed.    JOSE GRANT    Lab Frequency Next Occurrence   X-Ray Cervical Spine 2 or 3 Views Once 06/19/2023   CBC Auto Differential Once 08/18/2023   Comprehensive Metabolic Panel Once 08/18/2023   Lipid Panel Once 08/18/2023   TSH Once 08/18/2023   Vitamin D Once 08/18/2023   Ambulatory referral/consult to Physical/Occupational Therapy Once 10/24/2023   Mammo Digital Screening Bilat w/ Michael Once 10/17/2023

## 2024-01-18 NOTE — ASSESSMENT & PLAN NOTE
Change diclofenac to ibuprofen. Advised on kt tape. Educated on need to avoid taking more than one NSAID at a time. May take 2 extra strength Tylenol TID as needed for pain.  Patient states understanding. Will notify of xray results when available. Handouts for stretching exercises provided.

## 2024-01-18 NOTE — TELEPHONE ENCOUNTER
----- Message from JOSE Frye sent at 1/18/2024 10:24 AM CST -----  Notify patient xray results are normal. Still treat as bursitis. No further orders. Keep next appointment as scheduled.

## 2024-01-25 DIAGNOSIS — M62.838 TRAPEZIUS MUSCLE SPASM: Primary | ICD-10-CM

## 2024-01-25 RX ORDER — PRAMIPEXOLE DIHYDROCHLORIDE 0.5 MG/1
TABLET ORAL
Qty: 30 TABLET | Refills: 0 | Status: SHIPPED | OUTPATIENT
Start: 2024-01-25 | End: 2024-05-06 | Stop reason: SDUPTHER

## 2024-02-22 DIAGNOSIS — F41.1 GENERALIZED ANXIETY DISORDER: ICD-10-CM

## 2024-02-22 RX ORDER — BUSPIRONE HYDROCHLORIDE 10 MG/1
TABLET ORAL
Qty: 90 TABLET | Refills: 0 | OUTPATIENT
Start: 2024-02-22

## 2024-03-01 DIAGNOSIS — F41.1 GENERALIZED ANXIETY DISORDER: ICD-10-CM

## 2024-03-01 RX ORDER — BUSPIRONE HYDROCHLORIDE 10 MG/1
TABLET ORAL
Qty: 90 TABLET | Refills: 0 | OUTPATIENT
Start: 2024-03-01

## 2024-04-06 ENCOUNTER — HOSPITAL ENCOUNTER (EMERGENCY)
Facility: HOSPITAL | Age: 49
Discharge: HOME | End: 2024-04-06
Attending: SURGERY
Payer: MEDICAID

## 2024-04-06 VITALS
WEIGHT: 185 LBS | DIASTOLIC BLOOD PRESSURE: 85 MMHG | TEMPERATURE: 98 F | RESPIRATION RATE: 20 BRPM | SYSTOLIC BLOOD PRESSURE: 137 MMHG | BODY MASS INDEX: 36.32 KG/M2 | OXYGEN SATURATION: 98 % | HEART RATE: 76 BPM | HEIGHT: 60 IN

## 2024-04-06 DIAGNOSIS — L50.9 URTICARIA: Primary | ICD-10-CM

## 2024-04-06 DIAGNOSIS — M25.512 ACUTE PAIN OF LEFT SHOULDER: ICD-10-CM

## 2024-04-06 PROCEDURE — 25000003 PHARM REV CODE 250: Performed by: SURGERY

## 2024-04-06 PROCEDURE — 99284 EMERGENCY DEPT VISIT MOD MDM: CPT | Mod: 25

## 2024-04-06 PROCEDURE — 96374 THER/PROPH/DIAG INJ IV PUSH: CPT

## 2024-04-06 PROCEDURE — 96372 THER/PROPH/DIAG INJ SC/IM: CPT | Mod: 59 | Performed by: SURGERY

## 2024-04-06 PROCEDURE — 96375 TX/PRO/DX INJ NEW DRUG ADDON: CPT

## 2024-04-06 PROCEDURE — 63600175 PHARM REV CODE 636 W HCPCS: Performed by: SURGERY

## 2024-04-06 RX ORDER — DIPHENHYDRAMINE HYDROCHLORIDE 50 MG/ML
50 INJECTION INTRAMUSCULAR; INTRAVENOUS ONCE
Status: COMPLETED | OUTPATIENT
Start: 2024-04-06 | End: 2024-04-06

## 2024-04-06 RX ORDER — DEXAMETHASONE SODIUM PHOSPHATE 100 MG/10ML
10 INJECTION INTRAMUSCULAR; INTRAVENOUS ONCE
Status: COMPLETED | OUTPATIENT
Start: 2024-04-06 | End: 2024-04-06

## 2024-04-06 RX ORDER — DEXAMETHASONE SODIUM PHOSPHATE 100 MG/10ML
10 INJECTION INTRAMUSCULAR; INTRAVENOUS ONCE
Status: DISCONTINUED | OUTPATIENT
Start: 2024-04-06 | End: 2024-04-06

## 2024-04-06 RX ORDER — FAMOTIDINE 10 MG/ML
20 INJECTION INTRAVENOUS
Status: COMPLETED | OUTPATIENT
Start: 2024-04-06 | End: 2024-04-06

## 2024-04-06 RX ORDER — IBUPROFEN 600 MG/1
600 TABLET ORAL EVERY 6 HOURS PRN
Qty: 30 TABLET | Refills: 0 | Status: SHIPPED | OUTPATIENT
Start: 2024-04-06 | End: 2024-05-06

## 2024-04-06 RX ORDER — KETOROLAC TROMETHAMINE 30 MG/ML
15 INJECTION, SOLUTION INTRAMUSCULAR; INTRAVENOUS ONCE
Status: COMPLETED | OUTPATIENT
Start: 2024-04-06 | End: 2024-04-06

## 2024-04-06 RX ORDER — DIPHENHYDRAMINE HYDROCHLORIDE 50 MG/ML
50 INJECTION INTRAMUSCULAR; INTRAVENOUS ONCE
Status: DISCONTINUED | OUTPATIENT
Start: 2024-04-06 | End: 2024-04-06

## 2024-04-06 RX ORDER — HYDROXYZINE 50 MG/ML
50 INJECTION, SOLUTION INTRAMUSCULAR ONCE
Status: COMPLETED | OUTPATIENT
Start: 2024-04-06 | End: 2024-04-06

## 2024-04-06 RX ORDER — HYDROXYZINE 50 MG/ML
50 INJECTION, SOLUTION INTRAMUSCULAR ONCE
Status: DISCONTINUED | OUTPATIENT
Start: 2024-04-06 | End: 2024-04-06

## 2024-04-06 RX ORDER — HYDROXYZINE PAMOATE 50 MG/1
50 CAPSULE ORAL EVERY 8 HOURS PRN
Qty: 20 CAPSULE | Refills: 0 | Status: SHIPPED | OUTPATIENT
Start: 2024-04-06 | End: 2024-04-11

## 2024-04-06 RX ORDER — FAMOTIDINE 10 MG/ML
20 INJECTION INTRAVENOUS 2 TIMES DAILY
Status: DISCONTINUED | OUTPATIENT
Start: 2024-04-06 | End: 2024-04-06

## 2024-04-06 RX ADMIN — FAMOTIDINE 20 MG: 10 INJECTION, SOLUTION INTRAVENOUS at 09:04

## 2024-04-06 RX ADMIN — DIPHENHYDRAMINE HYDROCHLORIDE 50 MG: 50 INJECTION INTRAMUSCULAR; INTRAVENOUS at 09:04

## 2024-04-06 RX ADMIN — KETOROLAC TROMETHAMINE 15 MG: 30 INJECTION, SOLUTION INTRAMUSCULAR at 09:04

## 2024-04-06 RX ADMIN — DEXAMETHASONE SODIUM PHOSPHATE 10 MG: 10 INJECTION INTRAMUSCULAR; INTRAVENOUS at 08:04

## 2024-04-06 RX ADMIN — HYDROXYZINE HYDROCHLORIDE 50 MG: 50 INJECTION, SOLUTION INTRAMUSCULAR at 08:04

## 2024-04-06 NOTE — ED PROVIDER NOTES
Encounter Date: 4/6/2024       History     Chief Complaint   Patient presents with    Rash     PRESENTS TO ED PER POV WITH C/O RED, ITCHY RASH ONSET YESTERDAY. DENIES CONTACT WITH ANY ENVIRONMENTAL AGENTS     47 YO WF W/ ACUTE ONSET URTICARIA YESTERDAY PM.  NO SOB.  NO NV.  NO OTHER C/O.  +CAUSALITY UNKNOWN.        Review of patient's allergies indicates:   Allergen Reactions    Grass pollen      Other reaction(s): breathing, breathing issues     Past Medical History:   Diagnosis Date    ADHD (attention deficit hyperactivity disorder)     Anxiety     Arthritis     Bipolar disorder     Depression      Past Surgical History:   Procedure Laterality Date    CHOLECYSTECTOMY      LAPAROSCOPIC GASTRIC BANDING N/A      Family History   Problem Relation Age of Onset    Breast cancer Mother     Breast cancer Maternal Grandfather     Heart disease Paternal Grandfather     Diabetes Maternal Cousin      Social History     Tobacco Use    Smoking status: Every Day     Current packs/day: 0.50     Average packs/day: 0.5 packs/day for 20.3 years (10.2 ttl pk-yrs)     Types: Cigarettes     Start date: 12/12/2003     Passive exposure: Current    Smokeless tobacco: Never   Substance Use Topics    Alcohol use: Not Currently    Drug use: Yes     Types: Marijuana, Methamphetamines     Review of Systems   All other systems reviewed and are negative.      Physical Exam     Initial Vitals [04/06/24 0835]   BP Pulse Resp Temp SpO2   (!) 147/91 71 18 97.7 °F (36.5 °C) 100 %      MAP       --         Physical Exam    Nursing note and vitals reviewed.  Constitutional: She appears well-developed and well-nourished.   HENT:   Head: Normocephalic and atraumatic.   Right Ear: External ear normal.   Left Ear: External ear normal.   Nose: Nose normal.   Mouth/Throat: Oropharynx is clear and moist.   Eyes: Conjunctivae and EOM are normal. Pupils are equal, round, and reactive to light.   Neck: Neck supple.   Normal range of motion.  Cardiovascular:   Normal rate, regular rhythm, normal heart sounds and intact distal pulses.     Exam reveals no gallop and no friction rub.       No murmur heard.  Pulmonary/Chest: Breath sounds normal. No respiratory distress. She has no wheezes. She has no rhonchi. She has no rales. She exhibits no tenderness.   Abdominal: Abdomen is soft. Bowel sounds are normal.   Musculoskeletal:         General: Normal range of motion.      Cervical back: Normal range of motion and neck supple.     Neurological: She is alert and oriented to person, place, and time. She has normal strength. GCS score is 15. GCS eye subscore is 4. GCS verbal subscore is 5. GCS motor subscore is 6.   Skin: Skin is warm. Rash noted.   +GLOBAL URTICARIA     Psychiatric: She has a normal mood and affect. Her behavior is normal. Thought content normal.         ED Course   Procedures  Labs Reviewed - No data to display       Imaging Results    None          Medications   ketorolac injection 15 mg (has no administration in time range)   dexAMETHasone injection 10 mg (10 mg Intramuscular Given 4/6/24 0849)   hydrOXYzine injection 50 mg (50 mg Intramuscular Given 4/6/24 0849)   diphenhydrAMINE injection 50 mg (50 mg Intravenous Given 4/6/24 0930)   famotidine (PF) injection 20 mg (20 mg Intravenous Given 4/6/24 0930)     Medical Decision Making                                    Clinical Impression:  Final diagnoses:  [L50.9] Urticaria (Primary)          ED Disposition Condition    Discharge Stable          ED Prescriptions       Medication Sig Dispense Start Date End Date Auth. Provider    ibuprofen (ADVIL,MOTRIN) 600 MG tablet Take 1 tablet (600 mg total) by mouth every 6 (six) hours as needed for Pain (PRURITUS). 30 tablet 4/6/2024 -- Oniel Stubbs MD    hydrOXYzine pamoate (VISTARIL) 50 MG Cap Take 1 capsule (50 mg total) by mouth every 8 (eight) hours as needed (RASH). 20 capsule 4/6/2024 4/11/2024 Oniel Stubbs MD    ibuprofen (ADVIL,MOTRIN) 600 MG  tablet Take 1 tablet (600 mg total) by mouth every 6 (six) hours as needed for Pain. 30 tablet 4/6/2024 -- Oniel Stubbs MD          Follow-up Information    None          Oniel Stubbs MD  04/06/24 3837

## 2024-05-03 PROCEDURE — 80053 COMPREHEN METABOLIC PANEL: CPT | Performed by: NURSE PRACTITIONER

## 2024-05-03 PROCEDURE — 84443 ASSAY THYROID STIM HORMONE: CPT | Performed by: NURSE PRACTITIONER

## 2024-05-03 PROCEDURE — 80061 LIPID PANEL: CPT | Performed by: NURSE PRACTITIONER

## 2024-05-03 PROCEDURE — 85025 COMPLETE CBC W/AUTO DIFF WBC: CPT | Performed by: NURSE PRACTITIONER

## 2024-05-03 PROCEDURE — 82306 VITAMIN D 25 HYDROXY: CPT | Performed by: NURSE PRACTITIONER

## 2024-05-06 ENCOUNTER — OFFICE VISIT (OUTPATIENT)
Dept: FAMILY MEDICINE | Facility: CLINIC | Age: 49
End: 2024-05-06
Payer: MEDICAID

## 2024-05-06 VITALS
OXYGEN SATURATION: 98 % | TEMPERATURE: 98 F | DIASTOLIC BLOOD PRESSURE: 82 MMHG | WEIGHT: 184 LBS | HEIGHT: 60 IN | SYSTOLIC BLOOD PRESSURE: 120 MMHG | HEART RATE: 80 BPM | BODY MASS INDEX: 36.12 KG/M2

## 2024-05-06 DIAGNOSIS — J45.20 MILD INTERMITTENT REACTIVE AIRWAY DISEASE WITHOUT COMPLICATION: ICD-10-CM

## 2024-05-06 DIAGNOSIS — M17.11 OSTEOARTHRITIS OF RIGHT KNEE, UNSPECIFIED OSTEOARTHRITIS TYPE: ICD-10-CM

## 2024-05-06 DIAGNOSIS — E55.9 VITAMIN D DEFICIENCY: ICD-10-CM

## 2024-05-06 DIAGNOSIS — G25.81 RESTLESS LEGS SYNDROME: ICD-10-CM

## 2024-05-06 DIAGNOSIS — J01.00 ACUTE NON-RECURRENT MAXILLARY SINUSITIS: ICD-10-CM

## 2024-05-06 DIAGNOSIS — Z00.01 ABNORMAL WELLNESS EXAM: Primary | ICD-10-CM

## 2024-05-06 DIAGNOSIS — R41.840 DIFFICULTY CONCENTRATING: ICD-10-CM

## 2024-05-06 DIAGNOSIS — J30.2 SEASONAL ALLERGIES: ICD-10-CM

## 2024-05-06 DIAGNOSIS — E66.09 CLASS 2 OBESITY DUE TO EXCESS CALORIES WITHOUT SERIOUS COMORBIDITY WITH BODY MASS INDEX (BMI) OF 35.0 TO 35.9 IN ADULT: ICD-10-CM

## 2024-05-06 PROBLEM — E66.812 CLASS 2 OBESITY DUE TO EXCESS CALORIES WITHOUT SERIOUS COMORBIDITY WITH BODY MASS INDEX (BMI) OF 35.0 TO 35.9 IN ADULT: Status: ACTIVE | Noted: 2024-05-06

## 2024-05-06 PROBLEM — M25.512 ACUTE PAIN OF LEFT SHOULDER: Status: RESOLVED | Noted: 2024-01-18 | Resolved: 2024-05-06

## 2024-05-06 PROBLEM — J45.909 REACTIVE AIRWAY DISEASE WITHOUT COMPLICATION: Status: ACTIVE | Noted: 2024-05-06

## 2024-05-06 PROCEDURE — 3079F DIAST BP 80-89 MM HG: CPT | Mod: CPTII,,, | Performed by: NURSE PRACTITIONER

## 2024-05-06 PROCEDURE — 99396 PREV VISIT EST AGE 40-64: CPT | Mod: ,,, | Performed by: NURSE PRACTITIONER

## 2024-05-06 PROCEDURE — 3008F BODY MASS INDEX DOCD: CPT | Mod: CPTII,,, | Performed by: NURSE PRACTITIONER

## 2024-05-06 PROCEDURE — 1159F MED LIST DOCD IN RCRD: CPT | Mod: CPTII,,, | Performed by: NURSE PRACTITIONER

## 2024-05-06 PROCEDURE — 1160F RVW MEDS BY RX/DR IN RCRD: CPT | Mod: CPTII,,, | Performed by: NURSE PRACTITIONER

## 2024-05-06 PROCEDURE — 3074F SYST BP LT 130 MM HG: CPT | Mod: CPTII,,, | Performed by: NURSE PRACTITIONER

## 2024-05-06 RX ORDER — ATOMOXETINE 100 MG/1
100 CAPSULE ORAL DAILY
Qty: 30 CAPSULE | Refills: 0 | Status: CANCELLED | OUTPATIENT
Start: 2024-05-06

## 2024-05-06 RX ORDER — DICLOFENAC SODIUM 75 MG/1
75 TABLET, DELAYED RELEASE ORAL 2 TIMES DAILY
Qty: 60 TABLET | Refills: 5 | Status: SHIPPED | OUTPATIENT
Start: 2024-05-06

## 2024-05-06 RX ORDER — DICLOFENAC SODIUM 10 MG/G
2 GEL TOPICAL 3 TIMES DAILY PRN
Qty: 100 G | Refills: 1 | Status: SHIPPED | OUTPATIENT
Start: 2024-05-06

## 2024-05-06 RX ORDER — ERGOCALCIFEROL 1.25 MG/1
50000 CAPSULE ORAL
Qty: 13 CAPSULE | Refills: 3 | Status: SHIPPED | OUTPATIENT
Start: 2024-05-06

## 2024-05-06 RX ORDER — AMOXICILLIN AND CLAVULANATE POTASSIUM 875; 125 MG/1; MG/1
1 TABLET, FILM COATED ORAL 2 TIMES DAILY
Qty: 20 TABLET | Refills: 0 | Status: SHIPPED | OUTPATIENT
Start: 2024-05-06 | End: 2024-05-16

## 2024-05-06 RX ORDER — MONTELUKAST SODIUM 10 MG/1
10 TABLET ORAL DAILY
Qty: 30 TABLET | Refills: 5 | Status: SHIPPED | OUTPATIENT
Start: 2024-05-06

## 2024-05-06 RX ORDER — FLUTICASONE PROPIONATE 50 MCG
1 SPRAY, SUSPENSION (ML) NASAL 2 TIMES DAILY
Qty: 15.8 ML | Refills: 5 | Status: SHIPPED | OUTPATIENT
Start: 2024-05-06

## 2024-05-06 RX ORDER — BUSPIRONE HYDROCHLORIDE 10 MG/1
10 TABLET ORAL 3 TIMES DAILY
Qty: 90 TABLET | Refills: 0 | Status: CANCELLED | OUTPATIENT
Start: 2024-05-06

## 2024-05-06 RX ORDER — PRAMIPEXOLE DIHYDROCHLORIDE 0.5 MG/1
0.5 TABLET ORAL 3 TIMES DAILY
Qty: 30 TABLET | Refills: 5 | Status: SHIPPED | OUTPATIENT
Start: 2024-05-06

## 2024-05-06 RX ORDER — LORATADINE 10 MG/1
10 TABLET ORAL DAILY PRN
Qty: 30 TABLET | Refills: 5 | Status: SHIPPED | OUTPATIENT
Start: 2024-05-06 | End: 2025-05-06

## 2024-05-06 RX ORDER — CETIRIZINE HYDROCHLORIDE 10 MG/1
10 TABLET ORAL DAILY
Qty: 30 TABLET | Refills: 0 | Status: CANCELLED | OUTPATIENT
Start: 2024-05-06

## 2024-05-06 NOTE — PROGRESS NOTES
Patient ID: Trang Eugene  : 1975    Chief Complaint: Annual Exam    Allergies: Patient is allergic to grass pollen.     History of Present Illness:  The patient is a 49 y.o. White female who presents to clinic for annual wellness visit.    Diet and nutrition:  Diet is high in salt, high in fat, low in fiber, high caloric intake, high carbohydrate meals, high calcium intake.    Fracture risk: No history of fracture, no recent unexplained fracture.    Physical activity:  Does not exercise on a regular basis, good physical condition.    Depression risks:  + history of depression, never feel sad, empty, or tearful, no sleep disturbances, no agitation, no loss of energy, no feelings of worthlessness or guilt, no thoughts of suicide.    Orientation:  No disorientation to time, no disorientation to place.    Concentration and memory:  No decreased concentration ability, no memory lapses or loss, does not forget words.    Speech forward/motor difficulties: No speech difficulties, no difficulty expressing formulated concepts, no difficulty with fine manipulative tasks, no difficulty writing forward/copying, no slowed reaction time, does not knock things over when trying to pick them up.    Fall risk assessment:  No frequent falls while walking, no fall in the past year, no dizziness forward/vertigo, no fear of falling.  Hearing:  No loss of hearing, does not wear hearing aids.    Vision:  No vision problems, does wear glasses.    Activity of daily living: Able to bathe with limited or no assistance, able to control urination and bowels, able to dress with limited or no assistance, able to feed self with limited or no assistance, able to get out of chair or bed with limited or no assistance, able to [place] with limited or no assistance, able to toilet with limited are no assistance.    Activities of daily living:  Able to do housework with limited or no assistance, able to grocery shop with limited or no assistance,  able to manage medications with limited or no assistance, able to manage money with limited or no assistance, able to prepare meals with limited or no assistance, able to use the phone with limited or no assistance.    Screenings: not due for vaccinations, not due for breast cancer screening, not due for cervical cancer screening, not due for colorectal cancer screening (12/7/22 cologard negative)        Sinus Problem  This is a new problem. The current episode started 1 to 4 weeks ago. The problem has been gradually worsening since onset. There has been no fever. Associated symptoms include congestion, coughing, ear pain, sinus pressure, sneezing, a sore throat and swollen glands. Pertinent negatives include no chills, diaphoresis, headaches, hoarse voice, neck pain or shortness of breath. Past treatments include nasal decongestants. The treatment provided no relief.        Past Medical History:  has a past medical history of ADHD (attention deficit hyperactivity disorder), Anxiety, Arthritis, Bipolar disorder, and Depression.    Surgical History:  has a past surgical history that includes Cholecystectomy; Laparoscopic gastric banding (N/A); and Hernia repair.    Family History: family history includes Alcohol abuse in her maternal grandfather; Arthritis in her maternal grandmother; Breast cancer in her maternal grandfather and mother; Cancer in her maternal grandfather and mother; Depression in her father; Diabetes in her maternal cousin and paternal cousin; Drug abuse in her sister; Early death in her mother; Heart disease in her paternal grandfather; Hypertension in her maternal grandmother; Stroke in her maternal grandmother.    Social History:  reports that she has been smoking cigarettes and cigars. She started smoking about 20 years ago. She has a 10.2 pack-year smoking history. She has been exposed to tobacco smoke. She has never used smokeless tobacco. She reports that she does not currently use alcohol.  She reports current drug use. Frequency: 7.00 times per week. Drugs: Marijuana and Methamphetamines.    Care Team: Patient Care Team:  Avinash Salazar APRN as PCP - General (Family Medicine)  Graham Brink PMHNP as Nurse Practitioner (Psychiatry)  Dayana Hernandez NP as Nurse Practitioner (Obstetrics and Gynecology)  Dc Melissa, BARBI as Consulting Physician (Optometry)     Current Medications:  Current Outpatient Medications   Medication Instructions    albuterol (PROVENTIL/VENTOLIN HFA) 90 mcg/actuation inhaler 2 puffs, Inhalation, Every 4 hours PRN    amoxicillin-clavulanate 875-125mg (AUGMENTIN) 875-125 mg per tablet 1 tablet, Oral, 2 times daily    atomoxetine (STRATTERA) 100 mg, Oral, Daily    busPIRone (BUSPAR) 10 mg, Oral, 3 times daily    diclofenac (VOLTAREN) 75 mg, Oral, 2 times daily    diclofenac sodium (VOLTAREN) 2 g, Topical (Top), 3 times daily PRN    divalproex ER (DEPAKOTE ER) 500 mg, Oral, 2 times daily    ergocalciferol (VITAMIN D2) 50,000 Units, Oral, Every 7 days    fluticasone propionate (FLONASE) 50 mcg, Each Nostril, 2 times daily    loratadine (CLARITIN) 10 mg, Oral, Daily PRN    montelukast (SINGULAIR) 10 mg, Oral, Daily    pramipexole (MIRAPEX) 0.5 mg, Oral, 3 times daily       Review of Systems   Constitutional:  Negative for chills and diaphoresis.   HENT:  Positive for nasal congestion, ear pain, sinus pressure/congestion, sneezing and sore throat. Negative for hoarse voice.    Respiratory:  Positive for cough. Negative for shortness of breath.    Musculoskeletal:  Negative for neck pain.   Neurological:  Negative for headaches.        Visit Vitals  /82 (BP Location: Right arm)   Pulse 80   Temp 98.1 °F (36.7 °C) (Temporal)   Ht 5' (1.524 m)   Wt 83.5 kg (184 lb)   LMP 04/06/2024 (Approximate)   SpO2 98%   BMI 35.94 kg/m²       Physical Exam  Vitals and nursing note reviewed.   Constitutional:       Appearance: Normal appearance. She is obese.   HENT:       Head: Normocephalic and atraumatic.      Right Ear: A middle ear effusion is present.      Left Ear: A middle ear effusion is present. Tympanic membrane is injected.      Nose: Rhinorrhea present. Rhinorrhea is purulent.      Left Turbinates: Enlarged and swollen.      Left Sinus: Maxillary sinus tenderness present.      Mouth/Throat:      Mouth: Mucous membranes are moist.      Pharynx: Oropharynx is clear.   Eyes:      Extraocular Movements: Extraocular movements intact.      Conjunctiva/sclera: Conjunctivae normal.      Pupils: Pupils are equal, round, and reactive to light.   Cardiovascular:      Rate and Rhythm: Normal rate and regular rhythm.      Pulses: Normal pulses.      Heart sounds: No murmur heard.  Pulmonary:      Effort: Pulmonary effort is normal.      Breath sounds: Normal breath sounds.   Musculoskeletal:         General: No swelling. Normal range of motion.      Cervical back: Normal range of motion and neck supple.   Skin:     General: Skin is warm and dry.      Capillary Refill: Capillary refill takes less than 2 seconds.   Neurological:      General: No focal deficit present.      Mental Status: She is alert and oriented to person, place, and time.   Psychiatric:         Mood and Affect: Mood normal.         Judgment: Judgment normal.          Labs Reviewed:  Chemistry:  Lab Results   Component Value Date     05/03/2024    K 4.8 05/03/2024    CHLORIDE 107 05/03/2024    BUN 17.0 05/03/2024    CREATININE 1.01 05/03/2024    EGFRNORACEVR 68 05/03/2024    GLUCOSE 90 05/03/2024    CALCIUM 9.3 05/03/2024    ALKPHOS 87 05/03/2024    LABPROT 6.6 05/03/2024    ALBUMIN 3.9 05/03/2024    AST 24 05/03/2024    ALT 18 05/03/2024    HXRZUJTJ21SJ 20.6 (L) 05/03/2024    TSH 1.590 05/03/2024        Lab Results   Component Value Date    HGBA1C 5.5 05/19/2021        Hematology:  Lab Results   Component Value Date    WBC 7.41 05/03/2024    RBC 4.60 05/03/2024    HGB 13.8 05/03/2024    HCT 40.5 05/03/2024     MCV 88.0 05/03/2024    MCH 30.0 05/03/2024    MCHC 34.1 05/03/2024    RDW 12.5 05/03/2024     05/03/2024    MPV 12.0 05/03/2024       Lipid Panel:  Lab Results   Component Value Date    CHOL 138 05/03/2024    HDL 59 05/03/2024    DLDL 70.3 05/03/2024    TRIG 61 05/03/2024        Assessment & Plan:  1. Abnormal wellness exam  Overview:  Cervical Cancer Screening- patient will schedule apt with Dayana  Breast Cancer Screening- patient instructed to call and reschedule mammogram   Osteoporosis Screening- not due   Colon Cancer Screening - 12/7/22 cologard negative  Eye Exam- Recommend annually. Established with [facility]  Dental Exam- Recommend biannually.        Orders:  -     CBC Auto Differential; Future; Expected date: 05/06/2025  -     Comprehensive Metabolic Panel; Future; Expected date: 05/06/2025  -     Lipid Panel; Future; Expected date: 05/06/2025  -     TSH; Future; Expected date: 05/06/2025  -     Hemoglobin A1C; Future; Expected date: 05/06/2025  -     Vitamin D; Future; Expected date: 05/06/2025    2. Vitamin D deficiency  Overview:  Restart weekly vit D    Assessment & Plan:  Lab Results   Component Value Date    BPLDAZML03LU 20.6 (L) 05/03/2024         Orders:  -     ergocalciferol (VITAMIN D2) 50,000 unit Cap; Take 1 capsule (50,000 Units total) by mouth every 7 days.  Dispense: 13 capsule; Refill: 3  -     Vitamin D; Future; Expected date: 11/06/2024  -     Vitamin D; Future; Expected date: 05/06/2025    3. Mild intermittent reactive airway disease without complication  Overview:  Continue singulair and prn albuterol       4. Seasonal allergies  -     montelukast (SINGULAIR) 10 mg tablet; Take 1 tablet (10 mg total) by mouth Daily.  Dispense: 30 tablet; Refill: 5  -     fluticasone propionate (FLONASE) 50 mcg/actuation nasal spray; 1 spray (50 mcg total) by Each Nostril route 2 (two) times daily.  Dispense: 15.8 mL; Refill: 5  -     loratadine (CLARITIN) 10 mg tablet; Take 1  tablet (10 mg total) by mouth daily as needed for Allergies.  Dispense: 30 tablet; Refill: 5    5. Restless legs syndrome  Overview:  Symptoms improved with requip 0.5mg    Orders:  -     pramipexole (MIRAPEX) 0.5 MG tablet; Take 1 tablet (0.5 mg total) by mouth 3 (three) times daily.  Dispense: 30 tablet; Refill: 5  -     Comprehensive Metabolic Panel; Future; Expected date: 11/06/2024    6. Osteoarthritis of right knee, unspecified osteoarthritis type  -     diclofenac sodium (VOLTAREN) 1 % Gel; Apply 2 g topically 3 (three) times daily as needed (joint pain).  Dispense: 100 g; Refill: 1  -     diclofenac (VOLTAREN) 75 MG EC tablet; Take 1 tablet (75 mg total) by mouth 2 (two) times daily.  Dispense: 60 tablet; Refill: 5    7. Class 2 obesity due to excess calories without serious comorbidity with body mass index (BMI) of 35.0 to 35.9 in adult  Assessment & Plan:  This is a chronic and a comorbid condition that affected my decision making today. Patient educated on importance of diet and exercise.  Weight loss goals discussed. Recommended 30-45 minutes of exercise five days a week.  In addition, counseled patient on importance of low fat diet, limiting carbohydrate intake, and increasing protein and vegetable intake. Hand outs provided. All questions answered.         8. Acute non-recurrent maxillary sinusitis  Comments:  complete abx as prescribed  Orders:  -     amoxicillin-clavulanate 875-125mg (AUGMENTIN) 875-125 mg per tablet; Take 1 tablet by mouth 2 (two) times daily. for 10 days  Dispense: 20 tablet; Refill: 0    9. Difficulty concentrating  Assessment & Plan:  Scheduled patient apt with Rod tomorrow morning. Patient is aware               Vaccinations:  Immunization History   Administered Date(s) Administered    COVID-19 Vaccine 04/22/2021, 05/20/2021    COVID-19, MRNA, LN-S, PF (MODERNA FULL 0.5 ML DOSE) 04/22/2021, 05/20/2021    Influenza - Quadrivalent - PF *Preferred* (6 months and older) 10/17/2023        Future Appointments   Date Time Provider Department Center   5/7/2024  7:00 AM Graham Brink, PMHNP [facility]   11/6/2024 10:00 AM Avinash Salazar APRN [facility]       Follow up for 1), 6 mo f/u vit D , RLS,  , 2), 1 year, Wellness, fasting labs prior. Call sooner if needed.    JOSE GRANT    Lab Frequency Next Occurrence   X-Ray Cervical Spine 2 or 3 Views Once 06/19/2023   Ambulatory referral/consult to Physical/Occupational Therapy Once 10/24/2023   Mammo Digital Screening Bilat w/ Michael Once 10/17/2023   Ambulatory referral/consult to Physical/Occupational Therapy Once 01/25/2024   Comprehensive Metabolic Panel Once 11/06/2024   Vitamin D Once 11/06/2024   CBC Auto Differential Once 05/06/2025   Comprehensive Metabolic Panel Once 05/06/2025   Lipid Panel Once 05/06/2025   TSH Once 05/06/2025   Hemoglobin A1C Once 05/06/2025   Vitamin D Once 05/06/2025

## 2024-05-07 ENCOUNTER — OFFICE VISIT (OUTPATIENT)
Dept: BEHAVIORAL HEALTH | Facility: CLINIC | Age: 49
End: 2024-05-07
Payer: MEDICAID

## 2024-05-07 VITALS
WEIGHT: 187.19 LBS | SYSTOLIC BLOOD PRESSURE: 122 MMHG | TEMPERATURE: 98 F | OXYGEN SATURATION: 97 % | HEART RATE: 83 BPM | HEIGHT: 60 IN | BODY MASS INDEX: 36.75 KG/M2 | DIASTOLIC BLOOD PRESSURE: 74 MMHG

## 2024-05-07 DIAGNOSIS — F12.90 MARIJUANA USE, CONTINUOUS: ICD-10-CM

## 2024-05-07 DIAGNOSIS — F19.90 SUBSTANCE USE DISORDER: ICD-10-CM

## 2024-05-07 DIAGNOSIS — F31.9 BIPOLAR AFFECTIVE DISORDER, REMISSION STATUS UNSPECIFIED: Primary | ICD-10-CM

## 2024-05-07 DIAGNOSIS — F41.1 GENERALIZED ANXIETY DISORDER: ICD-10-CM

## 2024-05-07 DIAGNOSIS — R41.840 DIFFICULTY CONCENTRATING: ICD-10-CM

## 2024-05-07 DIAGNOSIS — F15.10 METHAMPHETAMINE ABUSE: ICD-10-CM

## 2024-05-07 PROCEDURE — 3008F BODY MASS INDEX DOCD: CPT | Mod: CPTII,,, | Performed by: NURSE PRACTITIONER

## 2024-05-07 PROCEDURE — 1159F MED LIST DOCD IN RCRD: CPT | Mod: CPTII,,, | Performed by: NURSE PRACTITIONER

## 2024-05-07 PROCEDURE — 3078F DIAST BP <80 MM HG: CPT | Mod: CPTII,,, | Performed by: NURSE PRACTITIONER

## 2024-05-07 PROCEDURE — 99214 OFFICE O/P EST MOD 30 MIN: CPT | Mod: ,,, | Performed by: NURSE PRACTITIONER

## 2024-05-07 PROCEDURE — 3074F SYST BP LT 130 MM HG: CPT | Mod: CPTII,,, | Performed by: NURSE PRACTITIONER

## 2024-05-07 PROCEDURE — 1160F RVW MEDS BY RX/DR IN RCRD: CPT | Mod: CPTII,,, | Performed by: NURSE PRACTITIONER

## 2024-05-07 RX ORDER — BUSPIRONE HYDROCHLORIDE 10 MG/1
10 TABLET ORAL 3 TIMES DAILY
Qty: 90 TABLET | Refills: 1 | Status: SHIPPED | OUTPATIENT
Start: 2024-05-07 | End: 2024-06-05

## 2024-05-07 RX ORDER — DIVALPROEX SODIUM 500 MG/1
500 TABLET, FILM COATED, EXTENDED RELEASE ORAL 2 TIMES DAILY
Qty: 60 TABLET | Refills: 1 | Status: SHIPPED | OUTPATIENT
Start: 2024-05-07

## 2024-05-07 RX ORDER — ATOMOXETINE 100 MG/1
100 CAPSULE ORAL DAILY
Qty: 30 CAPSULE | Refills: 1 | Status: SHIPPED | OUTPATIENT
Start: 2024-05-07 | End: 2024-06-05

## 2024-05-07 NOTE — PROGRESS NOTES
PSYCHIATRIC FOLLOW-UP VISIT NOTE    Chief Complaint   Patient presents with    Depression     Missed follow up-insurance change         History of Present Illness  49 y.o. year old White female with hx of bipolar disorder, SAMINA, poor focus, and extensive substance abuse seen today for follow-up appointment and medication management.  Patient was last seen in December of 2023.  She then lost her insurance coverage and was lost to follow-up.  Has been off of her psychiatric medications for approximately 2 months.  Reports some mild-to-moderate depression but adamantly denies any thoughts of harming self or others.  Anxiety has returned but it was described as moderate in nature.  Her focus and concentration has been poor.  No longer working at [employer] in [place] or through.  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  Also with a running vehicle at this time and increased financial stress.  Reports things are going very well at home between herself and her girlfriend.  Sleeping fairly well at night and feels rested most mornings.  Denies any recent methamphetamine or amphetamine use.  Continues with daily marijuana use.  Denies any side effects from current medication regimen. Patient denies SI/HI. Denies hallucinations and does not appear to be responding to internal stimuli or be internally preoccupied. No manic symptoms noted.       Objective:     Vitals:  Vitals:    05/07/24 0655   BP: 122/74   BP Location: Left arm   Patient Position: Sitting   Pulse: 83   Temp: 98.4 °F (36.9 °C)   TempSrc: Temporal   SpO2: 97%   Weight: 84.9 kg (187 lb 3.2 oz)   Height: 5' (1.524 m)       Wt Readings from Last 3 Encounters:   05/07/24 0655 84.9 kg (187 lb 3.2 oz)   05/06/24 1448 83.5 kg (184 lb)   04/06/24 0835 83.9 kg (185 lb)         Medication:    Current Outpatient Medications:     albuterol (PROVENTIL/VENTOLIN HFA) 90 mcg/actuation inhaler, Inhale 2 puffs into the lungs every 4 (four) hours as needed., Disp: , Rfl:      amoxicillin-clavulanate 875-125mg (AUGMENTIN) 875-125 mg per tablet, Take 1 tablet by mouth 2 (two) times daily. for 10 days, Disp: 20 tablet, Rfl: 0    atomoxetine (STRATTERA) 100 mg capsule, Take 1 capsule (100 mg total) by mouth Daily., Disp: 30 capsule, Rfl: 0    busPIRone (BUSPAR) 10 MG tablet, Take 1 tablet (10 mg total) by mouth 3 (three) times daily., Disp: 90 tablet, Rfl: 0    diclofenac (VOLTAREN) 75 MG EC tablet, Take 1 tablet (75 mg total) by mouth 2 (two) times daily., Disp: 60 tablet, Rfl: 5    diclofenac sodium (VOLTAREN) 1 % Gel, Apply 2 g topically 3 (three) times daily as needed (joint pain)., Disp: 100 g, Rfl: 1    divalproex ER (DEPAKOTE ER) 500 MG Tb24, Take 1 tablet (500 mg total) by mouth 2 (two) times daily., Disp: 60 tablet, Rfl: 0    ergocalciferol (VITAMIN D2) 50,000 unit Cap, Take 1 capsule (50,000 Units total) by mouth every 7 days., Disp: 13 capsule, Rfl: 3    fluticasone propionate (FLONASE) 50 mcg/actuation nasal spray, 1 spray (50 mcg total) by Each Nostril route 2 (two) times daily., Disp: 15.8 mL, Rfl: 5    loratadine (CLARITIN) 10 mg tablet, Take 1 tablet (10 mg total) by mouth daily as needed for Allergies., Disp: 30 tablet, Rfl: 5    montelukast (SINGULAIR) 10 mg tablet, Take 1 tablet (10 mg total) by mouth Daily., Disp: 30 tablet, Rfl: 5    pramipexole (MIRAPEX) 0.5 MG tablet, Take 1 tablet (0.5 mg total) by mouth 3 (three) times daily., Disp: 30 tablet, Rfl: 5       Significant Labs: - none at this time    Significant Imaging: - none at this time    Physical Exam  Vitals and nursing note reviewed.   Constitutional:       General: She is awake.      Appearance: Normal appearance.   Musculoskeletal:      Comments: Full ROM   Neurological:      Mental Status: She is alert.   Psychiatric:         Attention and Perception: Perception normal. She is inattentive. She does not perceive auditory or visual hallucinations.         Mood and Affect: Affect normal. Mood is anxious and  "depressed.         Speech: Speech normal.         Behavior: Behavior is cooperative.         Thought Content: Thought content does not include homicidal or suicidal ideation.         Cognition and Memory: Cognition and memory normal.         Judgment: Judgment normal.          Review of Systems     Mental Status Exam:  Presentation:  - Appearance: 49 y.o. year old White female, appears stated age, appears Casually dressed and Well groomed  - Motility: Erect when standing, Steady gait, No EPS or Tremors, No psychomotor agitation or retardation appreciated  - Behavior: anxious, cooperative, maintains eye contact  Speech:  - Character/Organization: spontaneous, fluent, normal volume, normal rate, normal rhythm  Emotional State:  - Mood: "anxious, "scatter brained""   - Affect: congruent and anxious  Thought:  - Process: logical, linear, organized , goal-directed  - Preoccupations: no ruminations, rituals, or phobias appreciated  - Delusions: no persecutory, paranoid, or grandiose delusions appreciated  - Perception: denies AVH, not actively responding to internal stimuli  - SI/HI: denies/denies  Sensorium & Intellect:  - Sensorium: AAOx4  - Memory: intact to recent and remote events  - Attention/Concentration: fair to poor  - Insight/Judgement: good/good    Gait: normal swing and stance  MSK:no rigidity appreciated    All other systems without acute issues unless noted in HPI      Assessment/Plan      ICD-10-CM ICD-9-CM    1. Bipolar affective disorder, remission status unspecified  F31.9 296.80       2. Generalized anxiety disorder  F41.1 300.02       3. Marijuana use, continuous  F12.90 305.21       4. Methamphetamine abuse  F15.10 305.70       5. Substance use disorder  F19.90 305.90       6. Difficulty concentrating  R41.840 799.51          Resume BuSpar, Depakote, Strattera at previous doses.  Patient reported good efficacy and denied any side effects    Continue other medications without change    Potential side " effects and risks vs benefits of current treatment plan reviewed with patient. Applicable black box warnings reviewed. Encouraged patient not to alter dosages or abruptly discontinue medications without contacting prescriber first, due to risk of worsening symptoms and decompensation of mental status. Warned of risks associated with herbal remedies and supplements while taking psychotropic medications and of the need to consult prescriber prior to adding any of these to current regimen. Patient should abstain from abuse of alcohol, prescription medications, and illicit drugs. Reviewed when to contact clinic and/or seek emergent care, such as but not limited to, onset/worsening SI/HI, hallucinations, delusions, manic symptoms. Pt verbalized understanding and agreement of these warnings/recommendations and verbally consented to treatment plan.      Follow up in about 6 weeks (around 6/18/2024) for Medication Management.    Graham Brink, SAURABHP     none

## 2024-06-05 DIAGNOSIS — R41.840 DIFFICULTY CONCENTRATING: ICD-10-CM

## 2024-06-05 DIAGNOSIS — F41.1 GENERALIZED ANXIETY DISORDER: ICD-10-CM

## 2024-06-05 RX ORDER — BUSPIRONE HYDROCHLORIDE 10 MG/1
TABLET ORAL
Qty: 90 TABLET | Refills: 1 | Status: SHIPPED | OUTPATIENT
Start: 2024-06-05

## 2024-06-05 RX ORDER — ATOMOXETINE 100 MG/1
CAPSULE ORAL
Qty: 30 CAPSULE | Refills: 1 | Status: SHIPPED | OUTPATIENT
Start: 2024-06-05

## 2024-07-24 ENCOUNTER — TELEPHONE (OUTPATIENT)
Dept: FAMILY MEDICINE | Facility: CLINIC | Age: 49
End: 2024-07-24
Payer: MEDICAID

## 2024-07-24 DIAGNOSIS — F41.1 GENERALIZED ANXIETY DISORDER: ICD-10-CM

## 2024-07-24 DIAGNOSIS — M17.11 OSTEOARTHRITIS OF RIGHT KNEE, UNSPECIFIED OSTEOARTHRITIS TYPE: ICD-10-CM

## 2024-07-24 DIAGNOSIS — R41.840 DIFFICULTY CONCENTRATING: ICD-10-CM

## 2024-07-24 RX ORDER — ATOMOXETINE 100 MG/1
100 CAPSULE ORAL DAILY
Qty: 30 CAPSULE | Refills: 0 | Status: SHIPPED | OUTPATIENT
Start: 2024-07-24

## 2024-07-24 RX ORDER — DICLOFENAC SODIUM 10 MG/G
GEL TOPICAL
Qty: 100 G | Refills: 1 | Status: SHIPPED | OUTPATIENT
Start: 2024-07-24

## 2024-07-24 RX ORDER — BUSPIRONE HYDROCHLORIDE 10 MG/1
10 TABLET ORAL 3 TIMES DAILY
Qty: 90 TABLET | Refills: 0 | Status: SHIPPED | OUTPATIENT
Start: 2024-07-24

## 2024-07-24 NOTE — TELEPHONE ENCOUNTER
Called patient ,appointment made and 30 day supply of medication sent. Informed patient that if appointment is missed medication will not be refilled. Verbalized understanding.

## 2024-08-01 ENCOUNTER — OFFICE VISIT (OUTPATIENT)
Dept: BEHAVIORAL HEALTH | Facility: CLINIC | Age: 49
End: 2024-08-01
Payer: MEDICAID

## 2024-08-01 VITALS — HEIGHT: 60 IN | BODY MASS INDEX: 36.75 KG/M2 | WEIGHT: 187.19 LBS

## 2024-08-01 DIAGNOSIS — F41.1 GENERALIZED ANXIETY DISORDER: ICD-10-CM

## 2024-08-01 DIAGNOSIS — F15.10 METHAMPHETAMINE ABUSE: ICD-10-CM

## 2024-08-01 DIAGNOSIS — F19.90 SUBSTANCE USE DISORDER: ICD-10-CM

## 2024-08-01 DIAGNOSIS — F31.9 BIPOLAR AFFECTIVE DISORDER, REMISSION STATUS UNSPECIFIED: Primary | ICD-10-CM

## 2024-08-01 DIAGNOSIS — F12.90 MARIJUANA USE, CONTINUOUS: ICD-10-CM

## 2024-08-01 DIAGNOSIS — R41.840 DIFFICULTY CONCENTRATING: ICD-10-CM

## 2024-08-01 RX ORDER — BUSPIRONE HYDROCHLORIDE 10 MG/1
10 TABLET ORAL 3 TIMES DAILY
Qty: 90 TABLET | Refills: 0 | Status: SHIPPED | OUTPATIENT
Start: 2024-08-01

## 2024-08-01 RX ORDER — ATOMOXETINE 100 MG/1
100 CAPSULE ORAL DAILY
Qty: 30 CAPSULE | Refills: 0 | Status: SHIPPED | OUTPATIENT
Start: 2024-08-01
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
## 2024-08-05 PROBLEM — Z00.01 ABNORMAL WELLNESS EXAM: Status: RESOLVED | Noted: 2023-06-19 | Resolved: 2024-08-05

## 2024-08-23 DIAGNOSIS — R41.840 DIFFICULTY CONCENTRATING: ICD-10-CM

## 2024-08-23 DIAGNOSIS — F41.1 GENERALIZED ANXIETY DISORDER: ICD-10-CM

## 2024-08-23 RX ORDER — ATOMOXETINE 100 MG/1
CAPSULE ORAL
Qty: 30 CAPSULE | Refills: 1 | OUTPATIENT
Start: 2024-08-23

## 2024-08-23 RX ORDER — BUSPIRONE HYDROCHLORIDE 10 MG/1
TABLET ORAL
Qty: 90 TABLET | Refills: 1 | OUTPATIENT
Start: 2024-08-23

## 2024-08-23 RX ORDER — DIVALPROEX SODIUM 500 MG/1
TABLET, FILM COATED, EXTENDED RELEASE ORAL
Qty: 60 TABLET | Refills: 1 | OUTPATIENT
Start: 2024-08-23

## 2024-08-27 RX ORDER — DIVALPROEX SODIUM 500 MG/1
TABLET, FILM COATED, EXTENDED RELEASE ORAL
Qty: 60 TABLET | Refills: 1 | OUTPATIENT
Start: 2024-08-27

## 2024-08-30 RX ORDER — DIVALPROEX SODIUM 500 MG/1
TABLET, FILM COATED, EXTENDED RELEASE ORAL
Qty: 60 TABLET | Refills: 1 | OUTPATIENT
Start: 2024-08-30

## 2024-09-13 DIAGNOSIS — J30.2 SEASONAL ALLERGIES: Primary | ICD-10-CM

## 2024-09-16 RX ORDER — ALBUTEROL SULFATE 90 UG/1
INHALANT RESPIRATORY (INHALATION)
Qty: 18 G | Refills: 0 | Status: SHIPPED | OUTPATIENT
Start: 2024-09-16

## 2024-09-25 DIAGNOSIS — M17.11 OSTEOARTHRITIS OF RIGHT KNEE, UNSPECIFIED OSTEOARTHRITIS TYPE: ICD-10-CM

## 2024-09-25 RX ORDER — DICLOFENAC SODIUM 10 MG/G
GEL TOPICAL
Qty: 100 G | Refills: 0 | Status: SHIPPED | OUTPATIENT
Start: 2024-09-25

## 2024-10-19 ENCOUNTER — HOSPITAL ENCOUNTER (EMERGENCY)
Facility: HOSPITAL | Age: 49
Discharge: HOME | End: 2024-10-19
Attending: EMERGENCY MEDICINE
Payer: MEDICAID

## 2024-10-19 VITALS
HEART RATE: 63 BPM | RESPIRATION RATE: 16 BRPM | OXYGEN SATURATION: 95 % | BODY MASS INDEX: 35.88 KG/M2 | TEMPERATURE: 98 F | DIASTOLIC BLOOD PRESSURE: 81 MMHG | WEIGHT: 182.75 LBS | SYSTOLIC BLOOD PRESSURE: 155 MMHG | HEIGHT: 60 IN
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
## 2024-10-19 LAB
ALBUMIN SERPL-MCNC: 3.6 G/DL (ref 3.4–5)
ALBUMIN/GLOB SERPL: 1.4 RATIO
ALP SERPL-CCNC: 67 UNIT/L (ref 50–144)
ALT SERPL-CCNC: 22 UNIT/L (ref 1–45)
ANION GAP SERPL CALC-SCNC: 4 MEQ/L (ref 2–13)
APAP SERPL-MCNC: <10 UG/ML (ref 10–30)
AST SERPL-CCNC: 27 UNIT/L (ref 14–36)
B-HCG UR QL: NEGATIVE
BACTERIA #/AREA URNS AUTO: NORMAL /HPF
BASE EXCESS BLD CALC-SCNC: 1.8 MMOL/L (ref -2–2)
BASOPHILS # BLD AUTO: 0.03 X10(3)/MCL (ref 0.01–0.08)
BASOPHILS NFR BLD AUTO: 0.7 % (ref 0.1–1.2)
BILIRUB SERPL-MCNC: 0.2 MG/DL (ref 0–1)
BILIRUB UR QL STRIP.AUTO: NEGATIVE
BLOOD GAS SAMPLE TYPE (OHS): ABNORMAL
BUN SERPL-MCNC: 12 MG/DL (ref 7–20)
CALCIUM SERPL-MCNC: 9.1 MG/DL (ref 8.4–10.2)
CHLORIDE SERPL-SCNC: 104 MMOL/L (ref 98–110)
CK SERPL-CCNC: 89 U/L (ref 30–135)
CLARITY UR: CLEAR
CO2 SERPL-SCNC: 27 MMOL/L (ref 21–32)
COHGB MFR BLDA: 4.6 % (ref 1–1.5)
COLOR UR AUTO: YELLOW
CREAT SERPL-MCNC: 0.77 MG/DL (ref 0.66–1.25)
CREAT/UREA NIT SERPL: 16 (ref 12–20)
EOSINOPHIL # BLD AUTO: 0.04 X10(3)/MCL (ref 0.04–0.36)
EOSINOPHIL NFR BLD AUTO: 0.9 % (ref 0.7–7)
ERYTHROCYTE [DISTWIDTH] IN BLOOD BY AUTOMATED COUNT: 12.8 % (ref 11–14.5)
ETHANOL BLD-MCNC: <0.01 G/DL
ETHANOL SERPL-MCNC: <10 MG/DL
GFR SERPLBLD CREATININE-BSD FMLA CKD-EPI: >90 ML/MIN/1.73/M2
GLOBULIN SER-MCNC: 2.6 GM/DL (ref 2–3.9)
GLUCOSE SERPL-MCNC: 94 MG/DL (ref 70–115)
GLUCOSE UR QL STRIP: NEGATIVE
HCO3 BLDA-SCNC: 25.7 MMOL/L (ref 23–28)
HCT VFR BLD AUTO: 36.7 % (ref 36–48)
HGB BLD-MCNC: 12.7 G/DL (ref 11.8–16)
HGB UR QL STRIP: ABNORMAL
IMM GRANULOCYTES # BLD AUTO: 0.01 X10(3)/MCL (ref 0–0.03)
IMM GRANULOCYTES NFR BLD AUTO: 0.2 % (ref 0–0.5)
INHALED O2 CONCENTRATION: 21 %
KETONES UR QL STRIP: NEGATIVE
LEUKOCYTE ESTERASE UR QL STRIP: NEGATIVE
LYMPHOCYTES # BLD AUTO: 0.78 X10(3)/MCL (ref 1.16–3.74)
LYMPHOCYTES NFR BLD AUTO: 18.4 % (ref 20–55)
MCH RBC QN AUTO: 31.2 PG (ref 27–34)
MCHC RBC AUTO-ENTMCNC: 34.6 G/DL (ref 31–37)
MCV RBC AUTO: 90.2 FL (ref 79–99)
METHGB MFR BLDA: 0.4 % (ref 0–1.5)
MONOCYTES # BLD AUTO: 0.46 X10(3)/MCL (ref 0.24–0.36)
MONOCYTES NFR BLD AUTO: 10.8 % (ref 4.7–12.5)
NEUTROPHILS # BLD AUTO: 2.93 X10(3)/MCL (ref 1.56–6.13)
NEUTROPHILS NFR BLD AUTO: 69 % (ref 37–73)
NITRITE UR QL STRIP: NEGATIVE
NRBC BLD AUTO-RTO: 0 %
PCO2 BLDA: 37.9 MMHG (ref 41–51)
PH BLDA: 7.44 [PH] (ref 7.31–7.41)
PH UR STRIP: 6.5 [PH]
PLATELET # BLD AUTO: 151 X10(3)/MCL (ref 140–371)
PMV BLD AUTO: 10.5 FL (ref 9.4–12.4)
PO2 BLDA: 46.7 MMHG (ref 30–40)
POTASSIUM SERPL-SCNC: 4.3 MMOL/L (ref 3.5–5.1)
PROT SERPL-MCNC: 6.2 GM/DL (ref 6.3–8.2)
PROT UR QL STRIP: NEGATIVE
RBC # BLD AUTO: 4.07 X10(6)/MCL (ref 4–5.1)
RBC #/AREA URNS AUTO: NORMAL /HPF
SALICYLATES SERPL-MCNC: 8.4 MG/DL
SAO2 % BLDA: 86.1 % (ref 60–80)
SODIUM SERPL-SCNC: 135 MMOL/L (ref 136–145)
SP GR UR STRIP.AUTO: <=1.005 (ref 1–1.03)
SQUAMOUS #/AREA URNS AUTO: NORMAL /HPF
TSH SERPL-ACNC: 1.31 UIU/ML (ref 0.36–3.74)
UROBILINOGEN UR STRIP-ACNC: 0.2
WBC # BLD AUTO: 4.25 X10(3)/MCL (ref 4–11.5)
WBC #/AREA URNS AUTO: NORMAL /HPF

## 2024-10-19 PROCEDURE — 85025 COMPLETE CBC W/AUTO DIFF WBC: CPT | Performed by: EMERGENCY MEDICINE

## 2024-10-19 PROCEDURE — 80053 COMPREHEN METABOLIC PANEL: CPT | Performed by: EMERGENCY MEDICINE

## 2024-10-19 PROCEDURE — 99900035 HC TECH TIME PER 15 MIN (STAT)

## 2024-10-19 PROCEDURE — 81025 URINE PREGNANCY TEST: CPT | Performed by: EMERGENCY MEDICINE

## 2024-10-19 PROCEDURE — 82803 BLOOD GASES ANY COMBINATION: CPT

## 2024-10-19 PROCEDURE — 80179 DRUG ASSAY SALICYLATE: CPT | Performed by: EMERGENCY MEDICINE

## 2024-10-19 PROCEDURE — 82550 ASSAY OF CK (CPK): CPT | Performed by: EMERGENCY MEDICINE

## 2024-10-19 PROCEDURE — 96375 TX/PRO/DX INJ NEW DRUG ADDON: CPT

## 2024-10-19 PROCEDURE — 99285 EMERGENCY DEPT VISIT HI MDM: CPT | Mod: 25

## 2024-10-19 PROCEDURE — 81015 MICROSCOPIC EXAM OF URINE: CPT | Performed by: EMERGENCY MEDICINE

## 2024-10-19 PROCEDURE — 96374 THER/PROPH/DIAG INJ IV PUSH: CPT

## 2024-10-19 PROCEDURE — 93010 ELECTROCARDIOGRAM REPORT: CPT | Mod: ,,, | Performed by: INTERNAL MEDICINE

## 2024-10-19 PROCEDURE — 81003 URINALYSIS AUTO W/O SCOPE: CPT | Performed by: EMERGENCY MEDICINE

## 2024-10-19 PROCEDURE — 96361 HYDRATE IV INFUSION ADD-ON: CPT

## 2024-10-19 PROCEDURE — 80143 DRUG ASSAY ACETAMINOPHEN: CPT | Performed by: EMERGENCY MEDICINE

## 2024-10-19 PROCEDURE — 25000003 PHARM REV CODE 250: Performed by: EMERGENCY MEDICINE

## 2024-10-19 PROCEDURE — 93005 ELECTROCARDIOGRAM TRACING: CPT

## 2024-10-19 PROCEDURE — 82077 ASSAY SPEC XCP UR&BREATH IA: CPT | Performed by: EMERGENCY MEDICINE

## 2024-10-19 PROCEDURE — 84443 ASSAY THYROID STIM HORMONE: CPT | Performed by: EMERGENCY MEDICINE

## 2024-10-19 PROCEDURE — 63600175 PHARM REV CODE 636 W HCPCS: Performed by: EMERGENCY MEDICINE

## 2024-10-19 PROCEDURE — 25500020 PHARM REV CODE 255: Performed by: EMERGENCY MEDICINE

## 2024-10-19 RX ORDER — KETOROLAC TROMETHAMINE 30 MG/ML
15 INJECTION, SOLUTION INTRAMUSCULAR; INTRAVENOUS
Status: COMPLETED | OUTPATIENT
Start: 2024-10-19 | End: 2024-10-19

## 2024-10-19 RX ORDER — METOCLOPRAMIDE HYDROCHLORIDE 5 MG/ML
10 INJECTION INTRAMUSCULAR; INTRAVENOUS
Status: COMPLETED | OUTPATIENT
Start: 2024-10-19 | End: 2024-10-19

## 2024-10-19 RX ORDER — DEXAMETHASONE SODIUM PHOSPHATE 4 MG/ML
8 INJECTION, SOLUTION INTRA-ARTICULAR; INTRALESIONAL; INTRAMUSCULAR; INTRAVENOUS; SOFT TISSUE
Status: COMPLETED | OUTPATIENT
Start: 2024-10-19 | End: 2024-10-19

## 2024-10-19 RX ADMIN — IOHEXOL 90 ML: 350 INJECTION, SOLUTION INTRAVENOUS at 02:10

## 2024-10-19 RX ADMIN — KETOROLAC TROMETHAMINE 15 MG: 30 INJECTION, SOLUTION INTRAMUSCULAR at 12:10

## 2024-10-19 RX ADMIN — DEXAMETHASONE SODIUM PHOSPHATE 8 MG: 4 INJECTION, SOLUTION INTRA-ARTICULAR; INTRALESIONAL; INTRAMUSCULAR; INTRAVENOUS; SOFT TISSUE at 12:10

## 2024-10-19 RX ADMIN — METOCLOPRAMIDE HYDROCHLORIDE 10 MG: 5 INJECTION INTRAMUSCULAR; INTRAVENOUS at 12:10

## 2024-10-19 RX ADMIN — SODIUM CHLORIDE 1000 ML: 9 INJECTION, SOLUTION INTRAVENOUS at 12:10

## 2024-10-19 NOTE — ED PROVIDER NOTES
Encounter Date: 10/19/2024       History     Chief Complaint   Patient presents with    Headache    Fall    Altered Mental Status     Pt presented to ED per POV with c/o headache, new onset confusion and unwitnessed fall while at work this morning. Pt family voiced pt was found on floor while at work and has been confused since.      49-year-old female with a past medical history anxiety, bipolar disorder, ADHD, and depression who denies SI and HI presents today with her significant other after being found down in the bathroom.  According to the significant other no one witnessed the fall but whenever they found her she was awake.  She noticed that she was confused when they found her in the bathroom.  According to the significant other and the patient, she has been having headaches for the past 4 days every morning when she wakes up.  Of note, the patient flipped off of her scooter a few days ago but was never seen.  The patient has complaints of right lower leg pain ever since that fall.  The patient has a long history of neck pain due to the type of work she does and her significant other typically gives her massages since it has been ongoing for several years.  Patient denies chest pain, shortness of breath, abdominal pain, black or bloody stools,  symptoms, lower extremity swelling, numbness, tingling, slurred speech, vision loss or changes, fever, chills, cough, sore throat, runny nose, congestion, or any other concerns.    The history is provided by the patient, the spouse and medical records. The history is limited by the condition of the patient.     Review of patient's allergies indicates:   Allergen Reactions    Grass pollen      Other reaction(s): breathing, breathing issues     Past Medical History:   Diagnosis Date    ADHD (attention deficit hyperactivity disorder)     Anxiety     Arthritis     Bipolar disorder     Depression      Past Surgical History:   Procedure Laterality Date    CHOLECYSTECTOMY       HERNIA REPAIR      LAPAROSCOPIC GASTRIC BANDING N/A      Family History   Problem Relation Name Age of Onset    Breast cancer Mother Cris Eugene     Cancer Mother Cris Eugene     Early death Mother Cris Eugene     Breast cancer Maternal Grandfather Brent Mariscal     Alcohol abuse Maternal Grandfather Brent Mariscal     Cancer Maternal Grandfather Brent Mariscal     Heart disease Paternal Grandfather Adalid Eugene     Diabetes Maternal Cousin      Depression Father Yony Eugene     Arthritis Maternal Grandmother Emerita Mariscal     Hypertension Maternal Grandmother Emerita Mariscal     Stroke Maternal Grandmother Emerita Mariscal     Diabetes Paternal Cousin Libby     Drug abuse Sister Chastity Eugene      Social History     Tobacco Use    Smoking status: Every Day     Current packs/day: 1.00     Average packs/day: 1 pack/day for 20.9 years (20.9 ttl pk-yrs)     Types: Cigarettes, Cigars     Start date: 12/12/2003     Passive exposure: Current    Smokeless tobacco: Never    Tobacco comments:     [employer]   Substance Use Topics    Alcohol use: Not Currently    Drug use: Yes     Frequency: 7.0 times per week     Types: Marijuana, Methamphetamines     Comment: Marijuana daily, meth socially     Review of Systems   Unable to perform ROS: Mental status change       Physical Exam     Initial Vitals [10/19/24 1004]   BP Pulse Resp Temp SpO2   136/86 65 18 97.5 °F (36.4 °C) 99 %      MAP       --         Physical Exam    Nursing note and vitals reviewed.  Constitutional: She appears well-developed and well-nourished. She is not diaphoretic. No distress.   HENT:   Head: Normocephalic and atraumatic.   Nose: Nose normal.   Eyes: Conjunctivae and EOM are normal. Pupils are equal, round, and reactive to light. Right eye exhibits no discharge. Left eye exhibits no discharge. No scleral icterus.   Neck: Neck supple. No thyromegaly present. No tracheal deviation present. No JVD present.   Normal range of motion.  Cardiovascular:   Normal rate, regular rhythm, normal heart sounds and intact distal pulses.           Pulmonary/Chest: Breath sounds normal. No stridor. No respiratory distress. She has no wheezes. She has no rhonchi. She has no rales. She exhibits no tenderness.   Abdominal: Abdomen is soft. Bowel sounds are normal. She exhibits no distension and no mass. There is no abdominal tenderness. There is no rebound and no guarding.   Musculoskeletal:         General: Normal range of motion.      Cervical back: Normal range of motion and neck supple.      Comments: Tenderness of right knee without bruising, erythema, swelling or warmth; tenderness of right ankle and lower tib/fib.  Normal strength, sensation and pulses.     Lymphadenopathy:     She has no cervical adenopathy.   Neurological: She is alert. She has normal strength. She displays normal reflexes. No cranial nerve deficit or sensory deficit. GCS eye subscore is 4. GCS verbal subscore is 5. GCS motor subscore is 6.   GCS 14;    NIHSS is 0   Skin: Skin is warm and dry. Capillary refill takes less than 2 seconds.   Psychiatric: She has a normal mood and affect. Her behavior is normal. Judgment and thought content normal.         ED Course   Procedures  Labs Reviewed   COMPREHENSIVE METABOLIC PANEL - Abnormal       Result Value    Sodium 135 (*)     Potassium 4.3      Chloride 104      CO2 27      Glucose 94      Blood Urea Nitrogen 12      Creatinine 0.77      Calcium 9.1      Protein Total 6.2 (*)     Albumin 3.6      Globulin 2.6      Albumin/Globulin Ratio 1.4      Bilirubin Total 0.2      ALP 67      ALT 22      AST 27      eGFR >90      Anion Gap 4.0      BUN/Creatinine Ratio 16     BLOOD GAS - Abnormal    Sample Type Venous Blood      pH, Blood gas 7.442 (*)     pCO2, Blood gas 37.9 (*)     pO2, Blood gas 46.7 (*)     CO Hgb 4.6 (*)     Met Hgb 0.4      sO2, Blood gas 86.1 (*)     HCO3, Blood gas 25.7      Base Excess, Blood gas 1.80      FIO2, Blood gas 21.0      ACETAMINOPHEN LEVEL - Abnormal    Acetaminophen Level <10.0 (*)    SALICYLATE LEVEL - Abnormal    Salicylate Level 8.4 (*)    URINALYSIS, REFLEX TO URINE CULTURE - Abnormal    Color, UA Yellow      Appearance, UA Clear      Specific Gravity, UA <=1.005      pH, UA 6.5      Protein, UA Negative      Glucose, UA Negative      Ketones, UA Negative      Blood, UA Trace-Intact (*)     Bilirubin, UA Negative      Urobilinogen, UA 0.2      Nitrites, UA Negative      Leukocyte Esterase, UA Negative      Narrative:      URINE STABILITY IS 2 HOURS AT ROOM TEMP OR    SIX HOURS REFRIGERATED. PERFORMING TESTING ON    SPECIMENS GREATER THAN THIS AGE MAY AFFECT THE    FOLLOWING TESTS:    PH          SPECIFIC GRAVITY           BLOOD    CLARITY     BILIRUBIN               UROBILINOGEN   CBC WITH DIFFERENTIAL - Abnormal    WBC 4.25      RBC 4.07      Hgb 12.7      Hct 36.7      MCV 90.2      MCH 31.2      MCHC 34.6      RDW 12.8      Platelet 151      MPV 10.5      Neut % 69.0      Lymph % 18.4 (*)     Mono % 10.8      Eos % 0.9      Basophil % 0.7      Lymph # 0.78 (*)     Neut # 2.93      Mono # 0.46 (*)     Eos # 0.04      Baso # 0.03      IG# 0.01      IG% 0.2      NRBC% 0.0     ALCOHOL,MEDICAL (ETHANOL) - Normal    Ethanol Level <10.0      Alcohol, Legal Level <0.010     HCG QUALITATIVE URINE - Normal    hCG Qualitative, Urine Negative     CK - Normal    Creatine Kinase 89     TSH - Normal    TSH 1.310     URINALYSIS, MICROSCOPIC - Normal    Bacteria, UA Rare      RBC, UA 0-2      WBC, UA 0-2      Squamous Epithelial Cells, UA Rare     CBC W/ AUTO DIFFERENTIAL    Narrative:     The following orders were created for panel order CBC auto differential.  Procedure                               Abnormality         Status                     ---------                               -----------         ------                     CBC with Differential[[id number]]       Abnormal            Final result                 Please view  results for these tests on the individual orders.   DRUG SCREEN, URINE (BEAKER)          Imaging Results              CTA Head and Neck (xpd) (Final result)  Result time 10/19/24 15:28:57      Final result by Simon Orta MD (10/19/24 15:28:57)                   Impression:      No acute abnormality. No high-grade stenosis or major vessel occlusion.      Electronically signed by: Simon Orta  Date:    10/19/2024  Time:    15:28               Narrative:    EXAMINATION:  CTA HEAD AND NECK (XPD)    CLINICAL HISTORY:  headsache;    TECHNIQUE:  CT angiogram was performed from the level of the luz to the top of the head following the IV administration of 100mL of Omnipaque 350.   Sagittal and coronal reconstructions and maximum intensity projection reconstructions were performed. Arterial stenosis percentages are based on NASCET measurement criteria.    Total DLP: 1487 mGy.cm    Automatic exposure control was utilized to reduce the patient's dose    COMPARISON:  None    FINDINGS:  Intracranial Compartment:    Ventricles and sulci are normal in size for age without evidence of hydrocephalus. No extra-axial blood or fluid collections.    The brain parenchyma appears normal. No parenchymal mass, hemorrhage, edema, or major vascular distribution infarct.    Skull/Extracranial Contents (limited evaluation): No fracture. Mastoid air cells and paranasal sinuses are essentially clear.    Non-Vascular Structures of the Neck/Thoracic Inlet (limited evaluation): Normal.    Aorta: Normal 3 vessel arch.    Extracranial carotid circulation: No hemodynamically significant stenosis, aneurysmal dilatation, or dissection.    Extracranial vertebral circulation: No hemodynamically significant stenosis, aneurysmal dilatation, or dissection.    Intracranial Arteries: No focal high-grade stenosis, occlusion, or aneurysm.    Venous structures (limited evaluation): Normal.                                       CT Cervical  Spine Without Contrast (Final result)  Result time 10/19/24 11:29:15      Final result by Simon Orta MD (10/19/24 11:29:15)                   Impression:      Mild degenerative changes are identified.  No acute bony abnormalities are seen.      Electronically signed by: Simon Orta  Date:    10/19/2024  Time:    11:29               Narrative:    EXAMINATION:  CT CERVICAL SPINE WITHOUT CONTRAST    CLINICAL HISTORY:  fall;    TECHNIQUE:  Low dose axial images, sagittal and coronal reformations were performed though the cervical spine.  Contrast was not administered.    Total DLP: 631 mGy.cm    Automatic exposure control was utilized to reduce the patient's dose    COMPARISON:  Plain films dated October 17, 2023    FINDINGS:  Imaging was obtained from the skull base to the cervicothoracic junction.  No retropharyngeal masses or soft tissue swelling are identified.  No adenopathy is seen.  The airway appears widely patent.  The visualized portions of the thyroid lobes and lung apices appear unremarkable.    Review of the bony structures demonstrates the cervical alignment to be unremarkable.  The disc spaces are well maintained.  The cervicothoracic junction appears normal.  Degenerative facet changes are seen.  The odontoid appears unremarkable.  No bony fractures are seen.                                       CT Head Without Contrast (Final result)  Result time 10/19/24 10:55:32      Final result by Simon Orta MD (10/19/24 10:55:32)                   Impression:      No acute abnormality.      Electronically signed by: Simon Orta  Date:    10/19/2024  Time:    10:55               Narrative:    EXAMINATION:  CT HEAD WITHOUT CONTRAST    CLINICAL HISTORY:  Headache, new or worsening, neuro deficit (Age 19-49y);    TECHNIQUE:  Low dose axial CT images obtained throughout the head without intravenous contrast. Sagittal and coronal reconstructions were performed.    Total DLP: 12 40  mGy.cm    Automatic exposure control was utilized to reduce the patient's dose    COMPARISON:  December 3, 2018    FINDINGS:  Intracranial compartment:    Ventricles and sulci are normal in size for age without evidence of hydrocephalus. No extra-axial blood or fluid collections.    The brain parenchyma appears normal. No parenchymal mass, hemorrhage, edema or major vascular distribution infarct.    Skull/extracranial contents (limited evaluation): No fracture. Mastoid air cells and paranasal sinuses are essentially clear.                                       X-Ray Ankle Complete Right (Final result)  Result time 10/19/24 11:40:40      Final result by Simon Orta MD (10/19/24 11:40:40)                   Impression:      No acute abnormalities are identified.      Electronically signed by: Simon Orta  Date:    10/19/2024  Time:    11:40               Narrative:    EXAMINATION:  XR ANKLE COMPLETE 3 VIEW RIGHT    CLINICAL HISTORY:  Unspecified fall, initial encounter    TECHNIQUE:  AP, lateral, and oblique images of the right ankle were performed.    COMPARISON:  None    FINDINGS:  There is good bone mineralization.  No bony fracture or dislocation is identified.  Degenerative changes are identified in the midfoot.  The ankle mortise is well maintained.  Calcaneal spurring is seen.  The soft tissues appear unremarkable.                                       X-Ray Tibia Fibula 2 View Right (Final result)  Result time 10/19/24 11:39:13      Final result by Simon Orta MD (10/19/24 11:39:13)                   Impression:      No acute abnormalities are identified.      Electronically signed by: Simon Orta  Date:    10/19/2024  Time:    11:39               Narrative:    EXAMINATION:  XR TIBIA FIBULA 2 VIEW RIGHT    CLINICAL HISTORY:  Unspecified fall, initial encounter    TECHNIQUE:  AP and lateral views of the right tibia and fibula were  performed.    COMPARISON:  None.    FINDINGS:  Degenerative changes are identified in the knee joint.  No acute bony fractures are identified.  Calcaneal spurring is seen.  No bony destructive lesions are identified.  The soft tissues appear unremarkable.                                       X-Ray Knee Complete 4 Or More Views Right (Final result)  Result time 10/19/24 11:31:57      Final result by Simon Orta MD (10/19/24 11:31:57)                   Impression:      No acute abnormality is identified.  Degenerative changes are identified in the knee joint.      Electronically signed by: Simon Orta  Date:    10/19/2024  Time:    11:31               Narrative:    EXAMINATION:  XR KNEE COMP 4 OR MORE VIEWS RIGHT    CLINICAL HISTORY:  fall;    COMPARISON:  June 7, 2021    FINDINGS:  There is good bony mineralization.  No bony fracture or dislocation is identified.  Extensive degenerative changes are again identified in the knee joint, particularly along the medial compartment.  Osteophyte formation is seen.  No joint effusion is noted.  The soft tissues appear unremarkable.                                       X-Ray Hips Bilateral 2 View Incl AP Pelvis (Final result)  Result time 10/19/24 11:30:59      Final result by Simon Orta MD (10/19/24 11:30:59)                   Impression:      No acute abnormalities are seen.      Electronically signed by: Simon Orta  Date:    10/19/2024  Time:    11:30               Narrative:    EXAMINATION:  XR HIPS BILATERAL 2 VIEW INCL AP PELVIS    CLINICAL HISTORY:  Unspecified fall, initial encounter    TECHNIQUE:  AP view of the pelvis and frogleg lateral views of both hips were performed.    COMPARISON:  None.    FINDINGS:  There is good bony mineralization.  No bony fracture or dislocation is identified.  Lap band tubing is noted.  Degenerative changes identified in the visualized portions of the lumbar spine and hip joints.  No acute bony  fractures are identified.  No bony destructive lesions are identified.  The bowel gas pattern appears unremarkable.                                       X-Ray Chest 1 View (Final result)  Result time 10/19/24 11:29:46      Final result by Simon Orta MD (10/19/24 11:29:46)                   Impression:      No active cardiopulmonary disease is identified.      Electronically signed by: Simon Orta  Date:    10/19/2024  Time:    11:29               Narrative:    EXAMINATION:  XR CHEST 1 VIEW    CLINICAL HISTORY:  Unspecified fall, initial encounter    TECHNIQUE:  Single frontal view of the chest was performed.    COMPARISON:  June 7, 2021    FINDINGS:  The heart size and pulmonary vasculature appear unremarkable.  The lung fields appear clear.  No pleural effusion or pneumothorax are noted.                                       Medications   metoclopramide injection 10 mg (10 mg Intravenous Given 10/19/24 1251)   sodium chloride 0.9% bolus 1,000 mL 1,000 mL (0 mLs Intravenous Stopped 10/19/24 1351)   ketorolac injection 15 mg (15 mg Intravenous Given 10/19/24 1251)   dexAMETHasone injection 8 mg (8 mg Intravenous Given 10/19/24 1251)   iohexoL (OMNIPAQUE 350) injection 100 mL (90 mLs Intravenous Given 10/19/24 1419)     Medical Decision Making  Differential diagnosis includes intracranial hemorrhage versus subdural hematoma versus intoxication versus COVID versus influenza versus subarachnoid hemorrhage versus CVA versus meningitis however the patient has no symptoms of meningitis and has no risk factors, fever, neck stiffness, Kernig's or Brudzinski's which are both negative, or other indications of meningitis versus other    The patient left against medical advice.  She was informed that she could have severe disability or death but she decided to leave.  CTA is pending.    Amount and/or Complexity of Data Reviewed  Labs: ordered.  Radiology: ordered.  ECG/medicine tests: ordered and independent  interpretation performed.     Details: Normal sinus rhythm; normal EKG; ventricular rate 61; DC interval 142; QRS 66; .    Risk  Prescription drug management.               ED Course as of 10/19/24 1658   Sat Oct 19, 2024   1014 The radiology tech informed me that they cannot do a CTA head and neck for this patient. [KF]   1421 The case was discussed with Dr. Pyle who stated the patient needs a CTA.  Radiology tech informed and CTA being done.  The patient's headache has significantly improved with the medications. [KF]      ED Course User Index  [KF] Irma Zarate MD                           Clinical Impression:  Final diagnoses:  [W19.XXXA] Fall          ED Disposition Condition    AMA                 Irma Zarate MD  10/19/24 2568

## 2024-10-21 LAB
OHS QRS DURATION: 66 MS
OHS QTC CALCULATION: 388 MS

## 2024-10-25 ENCOUNTER — TELEPHONE (OUTPATIENT)
Dept: FAMILY MEDICINE | Facility: CLINIC | Age: 49
End: 2024-10-25
Payer: MEDICAID

## 2024-10-29 ENCOUNTER — TELEPHONE (OUTPATIENT)
Dept: BEHAVIORAL HEALTH | Facility: CLINIC | Age: 49
End: 2024-10-29
Payer: MEDICAID

## 2024-10-29 ENCOUNTER — TELEPHONE (OUTPATIENT)
Dept: FAMILY MEDICINE | Facility: CLINIC | Age: 49
End: 2024-10-29
Payer: MEDICAID

## 2024-10-29 DIAGNOSIS — R41.840 DIFFICULTY CONCENTRATING: ICD-10-CM

## 2024-10-29 RX ORDER — ATOMOXETINE 100 MG/1
100 CAPSULE ORAL DAILY
Qty: 30 CAPSULE | Refills: 0 | Status: SHIPPED | OUTPATIENT
Start: 2024-10-29

## 2024-11-07 ENCOUNTER — HOSPITAL ENCOUNTER (EMERGENCY)
Facility: HOSPITAL | Age: 49
Discharge: HOME | End: 2024-11-07
Payer: MEDICAID

## 2024-11-07 VITALS
OXYGEN SATURATION: 99 % | HEART RATE: 76 BPM | BODY MASS INDEX: 35.73 KG/M2 | DIASTOLIC BLOOD PRESSURE: 104 MMHG | TEMPERATURE: 99 F | SYSTOLIC BLOOD PRESSURE: 150 MMHG | RESPIRATION RATE: 18 BRPM | HEIGHT: 60 IN | WEIGHT: 182 LBS

## 2024-11-07 DIAGNOSIS — G43.809 OTHER MIGRAINE WITHOUT STATUS MIGRAINOSUS, NOT INTRACTABLE: Primary | ICD-10-CM

## 2024-11-07 PROBLEM — G43.909 MIGRAINE WITHOUT STATUS MIGRAINOSUS, NOT INTRACTABLE: Status: ACTIVE | Noted: 2024-11-07

## 2024-11-07 PROCEDURE — 99284 EMERGENCY DEPT VISIT MOD MDM: CPT | Mod: 25

## 2024-11-07 PROCEDURE — 63600175 PHARM REV CODE 636 W HCPCS: Performed by: NURSE PRACTITIONER

## 2024-11-07 PROCEDURE — 25000003 PHARM REV CODE 250: Performed by: NURSE PRACTITIONER

## 2024-11-07 PROCEDURE — 96372 THER/PROPH/DIAG INJ SC/IM: CPT | Performed by: NURSE PRACTITIONER

## 2024-11-07 RX ORDER — HYDROCODONE BITARTRATE AND ACETAMINOPHEN 10; 325 MG/1; MG/1
1 TABLET ORAL
Status: COMPLETED | OUTPATIENT
Start: 2024-11-07 | End: 2024-11-07

## 2024-11-07 RX ORDER — DEXAMETHASONE SODIUM PHOSPHATE 4 MG/ML
8 INJECTION, SOLUTION INTRA-ARTICULAR; INTRALESIONAL; INTRAMUSCULAR; INTRAVENOUS; SOFT TISSUE
Status: COMPLETED | OUTPATIENT
Start: 2024-11-07 | End: 2024-11-07

## 2024-11-07 RX ORDER — PROMETHAZINE HYDROCHLORIDE 25 MG/ML
25 INJECTION, SOLUTION INTRAMUSCULAR; INTRAVENOUS
Status: COMPLETED | OUTPATIENT
Start: 2024-11-07 | End: 2024-11-07

## 2024-11-07 RX ADMIN — PROMETHAZINE HYDROCHLORIDE 25 MG: 25 INJECTION INTRAMUSCULAR; INTRAVENOUS at 03:11

## 2024-11-07 RX ADMIN — DEXAMETHASONE SODIUM PHOSPHATE 8 MG: 4 INJECTION, SOLUTION INTRA-ARTICULAR; INTRALESIONAL; INTRAMUSCULAR; INTRAVENOUS; SOFT TISSUE at 03:11

## 2024-11-07 RX ADMIN — HYDROCODONE BITARTRATE AND ACETAMINOPHEN 1 TABLET: 10; 325 TABLET ORAL at 03:11

## 2024-11-07 NOTE — Clinical Note
"Trang Delgado" Jabier was seen and treated in our emergency department on 11/7/2024.  She may return to work on 11/08/2024.       If you have any questions or concerns, please don't hesitate to call.      Gemini Rojas, KIMBERLYN"

## 2024-11-07 NOTE — ED PROVIDER NOTES
Encounter Date: 11/7/2024       History     Chief Complaint   Patient presents with    Headache     PT from urgent care, had steroid shot pta, pt states her temporal artery is inflamed again.      49 year old female presents with a migraine headache to left temporal region x1 day.  Sent from  and received Toradol IM injection prior to arrival with no symptom relief.     The history is provided by the patient. No  was used.     Review of patient's allergies indicates:   Allergen Reactions    Grass pollen      Other reaction(s): breathing, breathing issues     Past Medical History:   Diagnosis Date    ADHD (attention deficit hyperactivity disorder)     Anxiety     Arthritis     Bipolar disorder     Depression      Past Surgical History:   Procedure Laterality Date    CHOLECYSTECTOMY      HERNIA REPAIR      LAPAROSCOPIC GASTRIC BANDING N/A      Family History   Problem Relation Name Age of Onset    Breast cancer Mother Cris Eugene     Cancer Mother Cris Eugene     Early death Mother Cris Eugene     Breast cancer Maternal Grandfather Brent Mariscal     Alcohol abuse Maternal Grandfather Brent Mariscal     Cancer Maternal Grandfather Brent Mariscal     Heart disease Paternal Grandfather Adalid Eugene     Diabetes Maternal Cousin      Depression Father Yony Eugene     Arthritis Maternal Grandmother Emerita Mariscal     Hypertension Maternal Grandmother Emerita Mariscal     Stroke Maternal Grandmother Emerita Mariscal     Diabetes Paternal Cousin Libby     Drug abuse Sister Chastity Eugene      Social History     Tobacco Use    Smoking status: Every Day     Current packs/day: 1.00     Average packs/day: 1 pack/day for 20.9 years (20.9 ttl pk-yrs)     Types: Cigarettes, Cigars     Start date: 12/12/2003     Passive exposure: Current    Smokeless tobacco: Never    Tobacco comments:     Since highschool   Substance Use Topics    Alcohol use: Not Currently    Drug use: Yes     Frequency: 7.0 times per week     Types:  Marijuana, Methamphetamines     Comment: Marijuana daily, meth socially     Review of Systems   Neurological:  Positive for headaches.   All other systems reviewed and are negative.      Physical Exam     Initial Vitals [11/07/24 1446]   BP Pulse Resp Temp SpO2   (!) 150/104 76 18 98.7 °F (37.1 °C) 99 %      MAP       --         Physical Exam    Nursing note and vitals reviewed.  Constitutional: She appears well-developed and well-nourished.   HENT:   Head: Normocephalic and atraumatic.   Eyes: Conjunctivae and EOM are normal. Pupils are equal, round, and reactive to light.   Neck: Neck supple.   Normal range of motion.  Cardiovascular:  Normal rate, regular rhythm, normal heart sounds and intact distal pulses.           Pulmonary/Chest: Breath sounds normal.   Abdominal: Abdomen is soft. Bowel sounds are normal.   Musculoskeletal:         General: Normal range of motion.      Cervical back: Normal range of motion and neck supple.     Neurological: She is alert and oriented to person, place, and time. She has normal strength. GCS eye subscore is 4. GCS verbal subscore is 5. GCS motor subscore is 6.   Migraine headache with  photosensitivity   Skin: Skin is warm and dry. Capillary refill takes less than 2 seconds.   Psychiatric: She has a normal mood and affect. Her behavior is normal. Judgment and thought content normal.         ED Course   Procedures  Labs Reviewed - No data to display       Imaging Results    None          Medications   dexAMETHasone injection 8 mg (8 mg Intramuscular Given 11/7/24 1515)   promethazine injection 25 mg (25 mg Intramuscular Given 11/7/24 1515)   HYDROcodone-acetaminophen  mg per tablet 1 tablet (1 tablet Oral Given 11/7/24 1515)     Medical Decision Making  Problems Addressed:  Other migraine without status migrainosus, not intractable: acute illness or injury    Risk  Prescription drug management.                                      Clinical Impression:  Final  diagnoses:  [G43.809] Other migraine without status migrainosus, not intractable (Primary)          ED Disposition Condition    Discharge Stable          ED Prescriptions    None       Follow-up Information       Follow up With Specialties Details Why Contact Info    Avinash Salazar APRN Family Medicine In 3 days If symptoms worsen [address]  [address]  [facility] [id number]  [phone]               Gemini Rojas FNP  11/07/24 4715

## 2024-11-12 ENCOUNTER — OFFICE VISIT (OUTPATIENT)
Dept: FAMILY MEDICINE | Facility: CLINIC | Age: 49
End: 2024-11-12
Payer: MEDICAID

## 2024-11-12 ENCOUNTER — PATIENT MESSAGE (OUTPATIENT)
Dept: FAMILY MEDICINE | Facility: CLINIC | Age: 49
End: 2024-11-12

## 2024-11-12 VITALS
SYSTOLIC BLOOD PRESSURE: 118 MMHG | OXYGEN SATURATION: 99 % | TEMPERATURE: 98 F | HEIGHT: 60 IN | WEIGHT: 170 LBS | HEART RATE: 80 BPM | BODY MASS INDEX: 33.38 KG/M2 | DIASTOLIC BLOOD PRESSURE: 82 MMHG

## 2024-11-12 DIAGNOSIS — R51.9 TEMPORAL PAIN: Primary | ICD-10-CM

## 2024-11-12 DIAGNOSIS — H53.8 BLURRED VISION, BILATERAL: ICD-10-CM

## 2024-11-12 DIAGNOSIS — M50.30 DEGENERATIVE DISC DISEASE, CERVICAL: ICD-10-CM

## 2024-11-12 DIAGNOSIS — Z12.31 SCREENING MAMMOGRAM FOR BREAST CANCER: ICD-10-CM

## 2024-11-12 LAB — ERYTHROCYTE [SEDIMENTATION RATE] IN BLOOD: 27 MM/HR (ref 0–20)

## 2024-11-12 PROCEDURE — 84165 PROTEIN E-PHORESIS SERUM: CPT | Performed by: NURSE PRACTITIONER

## 2024-11-12 PROCEDURE — 99214 OFFICE O/P EST MOD 30 MIN: CPT | Mod: ,,, | Performed by: NURSE PRACTITIONER

## 2024-11-12 PROCEDURE — 86039 ANTINUCLEAR ANTIBODIES (ANA): CPT | Performed by: NURSE PRACTITIONER

## 2024-11-12 PROCEDURE — 85652 RBC SED RATE AUTOMATED: CPT | Performed by: NURSE PRACTITIONER

## 2024-11-12 PROCEDURE — 3074F SYST BP LT 130 MM HG: CPT | Mod: CPTII,,, | Performed by: NURSE PRACTITIONER

## 2024-11-12 PROCEDURE — 3079F DIAST BP 80-89 MM HG: CPT | Mod: CPTII,,, | Performed by: NURSE PRACTITIONER

## 2024-11-12 PROCEDURE — 84681 ASSAY OF C-PEPTIDE: CPT | Performed by: NURSE PRACTITIONER

## 2024-11-12 PROCEDURE — 1160F RVW MEDS BY RX/DR IN RCRD: CPT | Mod: CPTII,,, | Performed by: NURSE PRACTITIONER

## 2024-11-12 PROCEDURE — 3008F BODY MASS INDEX DOCD: CPT | Mod: CPTII,,, | Performed by: NURSE PRACTITIONER

## 2024-11-12 PROCEDURE — 1159F MED LIST DOCD IN RCRD: CPT | Mod: CPTII,,, | Performed by: NURSE PRACTITIONER

## 2024-11-12 RX ORDER — DICLOFENAC SODIUM 75 MG/1
75 TABLET, DELAYED RELEASE ORAL 2 TIMES DAILY
Qty: 60 TABLET | Refills: 5 | Status: SHIPPED | OUTPATIENT
Start: 2024-11-12

## 2024-11-12 NOTE — PROGRESS NOTES
"Patient ID: Trang Eugene  : 1975     Chief Complaint: swollen,temporal artery     Allergies: Patient is allergic to grass pollen.     History of Present Illness:  The patient is a 49 y.o. White female who presents to clinic for evaluation and management with a chief complaint of swollen,temporal artery    Patient reports recurring headaches. She reports she gets recurrent swelling around left side of temporal area. She was sent from urgent care to ER. She reports during these times she is disoriented and "people tell me stories of what happens."  Reports she has constant pain in her neck which is chronic but worsening over time. Patient reports when she gets severe pain in her neck, the pain radiates forward to her left eye area. Patient is taking diclofenac when she hurts until she ran out. She alternates with tylenol.     First incident occurred at work on 10/19/24. Reports she collapsed and coworker brought her to the ER. CT head and  CTA  head were negative.  CT cervical spine showed mild degenerative changes.    2nd incident  occurred , she went to urgent care who sent her to ER. She was treated with toradol and steroids which helped the pain.     Patient reports when she feels like her temporal area is swollen, she experiences blurred vision, throbbing headache on left side.  Denies scalp tenderness, jaw pain, loss of vision.             Past Medical History:  has a past medical history of ADHD (attention deficit hyperactivity disorder), Anxiety, Arthritis, Bipolar disorder, and Depression.    Social History:  reports that she has been smoking cigarettes and cigars. She started smoking about 20 years ago. She has a 20.9 pack-year smoking history. She has been exposed to tobacco smoke. She has never used smokeless tobacco. She reports that she does not currently use alcohol. She reports current drug use. Frequency: 7.00 times per week. Drugs: Marijuana and Methamphetamines.    Care Team: Patient " Care Team:  Avinash Salazar APRN as PCP - General (Family Medicine)  Graham Brink PMHNP as Nurse Practitioner (Psychiatry)  Dayana Hernandez NP as Nurse Practitioner (Obstetrics and Gynecology)  Dc Melissa OD as Consulting Physician (Optometry)     Current Medications:  Current Outpatient Medications   Medication Instructions    albuterol (PROVENTIL/VENTOLIN HFA) 90 mcg/actuation inhaler INHALE ONE(1) OR TWO(2) PUFFS EVERY 4 HOURS AS NEEDED FOR COUGH, WHEEZE, &/OR SHORTNESS OF BREATH /SHAKE WELL    atomoxetine (STRATTERA) 100 mg, Oral, Daily    busPIRone (BUSPAR) 10 mg, Oral, 3 times daily    diclofenac (VOLTAREN) 75 mg, Oral, 2 times daily    diclofenac sodium (VOLTAREN) 1 % Gel APPLY TWO(2) GRAMS TO AFFECTED AREA(S) 3 TIMES DAILY AS NEEDED FOR PAIN (JOINT PAIN)    ergocalciferol (VITAMIN D2) 50,000 Units, Oral, Every 7 days    fluticasone propionate (FLONASE) 50 mcg, Each Nostril, 2 times daily    loratadine (CLARITIN) 10 mg, Oral, Daily PRN    montelukast (SINGULAIR) 10 mg, Oral, Daily    pramipexole (MIRAPEX) 0.5 mg, Oral, 3 times daily       Review of Systems   Constitutional:  Negative for activity change and unexpected weight change.   HENT:  Positive for hearing loss and rhinorrhea. Negative for trouble swallowing.    Eyes:  Positive for visual disturbance. Negative for discharge.   Respiratory:  Negative for chest tightness and wheezing.    Cardiovascular:  Positive for palpitations. Negative for chest pain.   Gastrointestinal:  Positive for constipation. Negative for blood in stool, diarrhea and vomiting.   Endocrine: Negative for polydipsia and polyuria.   Genitourinary:  Negative for difficulty urinating, dysuria, hematuria and menstrual problem.   Musculoskeletal:  Positive for arthralgias, joint swelling and neck pain.   Neurological:  Positive for weakness and headaches.   Psychiatric/Behavioral:  Positive for confusion. Negative for dysphoric mood.         Visit  Vitals  /82 (BP Location: Left arm)   Pulse 80   Temp 98.1 °F (36.7 °C) (Temporal)   Ht 5' (1.524 m)   Wt 77.1 kg (170 lb)   LMP 10/12/2024   SpO2 99%   BMI 33.20 kg/m²       Physical Exam  Vitals and nursing note reviewed.   Constitutional:       Appearance: Normal appearance. She is obese.   HENT:      Head: Normocephalic and atraumatic.      Comments: No tenderness with light touch of left temporal area     Nose: Nose normal.      Mouth/Throat:      Mouth: Mucous membranes are moist.      Pharynx: Oropharynx is clear.   Eyes:      Extraocular Movements: Extraocular movements intact.      Conjunctiva/sclera: Conjunctivae normal.      Pupils: Pupils are equal, round, and reactive to light.   Cardiovascular:      Rate and Rhythm: Normal rate and regular rhythm.      Pulses: Normal pulses.      Heart sounds: No murmur heard.  Pulmonary:      Effort: Pulmonary effort is normal.      Breath sounds: Normal breath sounds.   Musculoskeletal:         General: Tenderness (with palpation of left trapezius) present. No swelling.      Left shoulder: Tenderness (with palpation of proximal bicep tendon) and bony tenderness present. No swelling, deformity or crepitus. Decreased range of motion.      Cervical back: Normal range of motion and neck supple.   Skin:     General: Skin is warm and dry.      Capillary Refill: Capillary refill takes less than 2 seconds.   Neurological:      General: No focal deficit present.      Mental Status: She is alert and oriented to person, place, and time. Mental status is at baseline.      Cranial Nerves: No facial asymmetry.      Sensory: No sensory deficit.   Psychiatric:         Mood and Affect: Mood normal.         Judgment: Judgment normal.          Labs Reviewed:  Chemistry:  Lab Results   Component Value Date     (L) 10/19/2024    K 4.3 10/19/2024    BUN 12 10/19/2024    CREATININE 0.77 10/19/2024    EGFRNORACEVR >90 10/19/2024    GLUCOSE 94 10/19/2024    CALCIUM 9.1 10/19/2024     ALKPHOS 67 10/19/2024    LABPROT 6.2 (L) 10/19/2024    ALBUMIN 3.6 10/19/2024    AST 27 10/19/2024    ALT 22 10/19/2024    GOQZOBVT26DA 20.6 (L) 05/03/2024    TSH 1.310 10/19/2024        Lab Results   Component Value Date    HGBA1C 5.5 05/19/2021        Hematology:  Lab Results   Component Value Date    WBC 4.25 10/19/2024    RBC 4.07 10/19/2024    HGB 12.7 10/19/2024    HCT 36.7 10/19/2024    MCV 90.2 10/19/2024    MCH 31.2 10/19/2024    MCHC 34.6 10/19/2024    RDW 12.8 10/19/2024     10/19/2024    MPV 10.5 10/19/2024       Lipid Panel:  Lab Results   Component Value Date    CHOL 138 05/03/2024    HDL 59 05/03/2024    LDLDIRECT 70.3 05/03/2024    TRIG 61 05/03/2024        Assessment & Plan:  1. Temporal pain  -     Ambulatory referral/consult to Optometry; Future; Expected date: 11/19/2024  -     C-Peptide; Future; Expected date: 11/12/2024  -     Sedimentation rate; Future; Expected date: 11/12/2024  -     ALONSO IgG by IFA; Future; Expected date: 11/12/2024  -     Protein Electrophoresis, Serum, w/Interp; Future; Expected date: 11/12/2024    2. Blurred vision, bilateral  -     Ambulatory referral/consult to Optometry; Future; Expected date: 11/19/2024  -     C-Peptide; Future; Expected date: 11/12/2024  -     Sedimentation rate; Future; Expected date: 11/12/2024  -     ALONSO IgG by IFA; Future; Expected date: 11/12/2024  -     Protein Electrophoresis, Serum, w/Interp; Future; Expected date: 11/12/2024    3. Degenerative disc disease, cervical  Comments:  10/19/24 CT cervical spine showed mild degenerateive changes, restart nsaid, consult PT  Orders:  -     diclofenac (VOLTAREN) 75 MG EC tablet; Take 1 tablet (75 mg total) by mouth 2 (two) times daily.  Dispense: 60 tablet; Refill: 5  -     Ambulatory referral/consult to Physical/Occupational Therapy; Future; Expected date: 11/19/2024    4. Screening mammogram for breast cancer  -     Mammo Digital Screening Bilat w/ Michael; Future; Expected date:  11/12/2024         Future Appointments   Date Time Provider Department Center   2/14/2025  8:00 AM LAB, [facility] LABORATORY DRAW STATION [facility]   2/18/2025 11:00 [facility] [place] [facility]       Follow up for Wellness, Fasting labs prior. Call sooner if needed.    JOSE GRANT        Lab Frequency Next Occurrence   Ambulatory referral/consult to Physical/Occupational Therapy Once 10/24/2023   Mammo Digital Screening Bilat w/ Michael Once 10/17/2023   Ambulatory referral/consult to Physical/Occupational Therapy Once 01/25/2024   Comprehensive Metabolic Panel Once 11/06/2024   Vitamin D Once 11/06/2024   CBC Auto Differential Once 05/06/2025   Comprehensive Metabolic Panel Once 05/06/2025   Lipid Panel Once 05/06/2025   TSH Once 05/06/2025   Hemoglobin A1C Once 05/06/2025   Vitamin D Once 05/06/2025

## 2024-11-14 LAB
ALBUMIN % SPEP (OHS): 39.23 (ref 48.1–59.5)
ALBUMIN SERPL-MCNC: 3 G/DL (ref 3.5–5)
ALBUMIN/GLOB SERPL: 0.6 RATIO (ref 1.1–2)
ALPHA 1 GLOB (OHS): 0.29 GM/DL (ref 0–0.4)
ALPHA 1 GLOB% (OHS): 3.75 (ref 2.3–4.9)
ALPHA 2 GLOB % (OHS): 9.61 (ref 6.9–13)
ALPHA 2 GLOB (OHS): 0.74 GM/DL (ref 0.4–1)
ANA SER QL HEP2 SUBST: NORMAL
BETA GLOB (OHS): 1.24 GM/DL (ref 0.7–1.3)
BETA GLOB% (OHS): 16.15 (ref 13.8–19.7)
C PEPTIDE P FAST SERPL-MCNC: 2.8 NG/ML (ref 1.1–4.4)
GAMMA GLOBULIN % (OHS): 31.24 (ref 10.1–21.9)
GAMMA GLOBULIN (OHS): 2.41 GM/DL (ref 0.4–1.8)
GLOBULIN SER-MCNC: 4.7 GM/DL (ref 2.4–3.5)
M SPIKE % (OHS): ABNORMAL
M SPIKE (OHS): ABNORMAL
PATH REV: NORMAL
PROT SERPL-MCNC: 7.7 GM/DL (ref 6.4–8.3)

## 2024-11-21 DIAGNOSIS — J30.2 SEASONAL ALLERGIES: ICD-10-CM

## 2024-11-22 DIAGNOSIS — F41.1 GENERALIZED ANXIETY DISORDER: ICD-10-CM

## 2024-11-22 RX ORDER — ALBUTEROL SULFATE 90 UG/1
2 INHALANT RESPIRATORY (INHALATION) EVERY 4 HOURS PRN
Qty: 18 G | Refills: 1 | Status: SHIPPED | OUTPATIENT
Start: 2024-11-22

## 2024-11-22 RX ORDER — BUSPIRONE HYDROCHLORIDE 10 MG/1
TABLET ORAL
Qty: 90 TABLET | Refills: 0 | OUTPATIENT
Start: 2024-11-22

## 2024-12-04 ENCOUNTER — TELEPHONE (OUTPATIENT)
Dept: HEMATOLOGY/ONCOLOGY | Facility: CLINIC | Age: 49
End: 2024-12-04
Payer: MEDICAID

## 2024-12-04 ENCOUNTER — TELEPHONE (OUTPATIENT)
Dept: FAMILY MEDICINE | Facility: CLINIC | Age: 49
End: 2024-12-04
Payer: MEDICAID

## 2024-12-04 DIAGNOSIS — R77.1 HYPERGLOBULINEMIA: Primary | ICD-10-CM

## 2024-12-04 NOTE — TELEPHONE ENCOUNTER
Hello,    HemOnc referral was created for this pt for hyperglobulinemia.    Labs were reviewed. SPEP with normal immunofixation and normal free light chains. No further work up from HemOnc is needed at this time.    Please let us know if you have any questions.    KIRBY Bunch

## 2024-12-04 NOTE — TELEPHONE ENCOUNTER
----- Message from JOSE Frye sent at 12/4/2024 12:43 PM CST -----  Please notify patient of following results:   Blood results show elevated proteins in blood. Unsure if this is a significant finding. I will refer to hematology to further workup.

## 2024-12-05 ENCOUNTER — TELEPHONE (OUTPATIENT)
Dept: FAMILY MEDICINE | Facility: CLINIC | Age: 49
End: 2024-12-05
Payer: MEDICAID

## 2024-12-12 DIAGNOSIS — J30.2 SEASONAL ALLERGIES: ICD-10-CM

## 2024-12-12 RX ORDER — ALBUTEROL SULFATE 90 UG/1
INHALANT RESPIRATORY (INHALATION)
Qty: 18 G | Refills: 1 | Status: SHIPPED | OUTPATIENT
Start: 2024-12-12

## 2024-12-17 ENCOUNTER — OFFICE VISIT (OUTPATIENT)
Dept: FAMILY MEDICINE | Facility: CLINIC | Age: 49
End: 2024-12-17
Payer: MEDICAID

## 2024-12-17 ENCOUNTER — HOSPITAL ENCOUNTER (OUTPATIENT)
Dept: RADIOLOGY | Facility: HOSPITAL | Age: 49
Discharge: HOME | End: 2024-12-17
Attending: NURSE PRACTITIONER
Payer: MEDICAID

## 2024-12-17 ENCOUNTER — PATIENT MESSAGE (OUTPATIENT)
Dept: FAMILY MEDICINE | Facility: CLINIC | Age: 49
End: 2024-12-17

## 2024-12-17 VITALS
DIASTOLIC BLOOD PRESSURE: 84 MMHG | WEIGHT: 175 LBS | HEART RATE: 91 BPM | BODY MASS INDEX: 34.36 KG/M2 | OXYGEN SATURATION: 99 % | HEIGHT: 60 IN | SYSTOLIC BLOOD PRESSURE: 122 MMHG | TEMPERATURE: 98 F

## 2024-12-17 DIAGNOSIS — R07.89 OTHER CHEST PAIN: ICD-10-CM

## 2024-12-17 DIAGNOSIS — S81.801A WOUND OF RIGHT LEG, INITIAL ENCOUNTER: Primary | ICD-10-CM

## 2024-12-17 DIAGNOSIS — R51.9 LEFT TEMPORAL HEADACHE: ICD-10-CM

## 2024-12-17 DIAGNOSIS — Z12.31 SCREENING MAMMOGRAM FOR BREAST CANCER: ICD-10-CM

## 2024-12-17 LAB
ALBUMIN SERPL-MCNC: 3.5 G/DL (ref 3.4–5)
ALBUMIN/GLOB SERPL: 1.2 RATIO
ALP SERPL-CCNC: 111 UNIT/L (ref 50–144)
ALT SERPL-CCNC: 21 UNIT/L (ref 1–45)
ANION GAP SERPL CALC-SCNC: 7 MEQ/L (ref 2–13)
AST SERPL-CCNC: 26 UNIT/L (ref 14–36)
BASOPHILS # BLD AUTO: 0.03 X10(3)/MCL (ref 0.01–0.08)
BASOPHILS NFR BLD AUTO: 0.4 % (ref 0.1–1.2)
BILIRUB SERPL-MCNC: 0.6 MG/DL (ref 0–1)
BUN SERPL-MCNC: 13 MG/DL (ref 7–20)
CALCIUM SERPL-MCNC: 9 MG/DL (ref 8.4–10.2)
CHLORIDE SERPL-SCNC: 100 MMOL/L (ref 98–110)
CO2 SERPL-SCNC: 26 MMOL/L (ref 21–32)
CREAT SERPL-MCNC: 0.87 MG/DL (ref 0.66–1.25)
CREAT/UREA NIT SERPL: 15 (ref 12–20)
EOSINOPHIL # BLD AUTO: 0.01 X10(3)/MCL (ref 0.04–0.36)
EOSINOPHIL NFR BLD AUTO: 0.1 % (ref 0.7–7)
ERYTHROCYTE [DISTWIDTH] IN BLOOD BY AUTOMATED COUNT: 13.2 % (ref 11–14.5)
ERYTHROCYTE [SEDIMENTATION RATE] IN BLOOD: 25 MM/HR (ref 0–20)
GFR SERPLBLD CREATININE-BSD FMLA CKD-EPI: 82 ML/MIN/1.73/M2
GLOBULIN SER-MCNC: 3 GM/DL (ref 2–3.9)
GLUCOSE SERPL-MCNC: 121 MG/DL (ref 70–115)
HCT VFR BLD AUTO: 33.2 % (ref 36–48)
HGB BLD-MCNC: 11.5 G/DL (ref 11.8–16)
IMM GRANULOCYTES # BLD AUTO: 0.04 X10(3)/MCL (ref 0–0.03)
IMM GRANULOCYTES NFR BLD AUTO: 0.5 % (ref 0–0.5)
LYMPHOCYTES # BLD AUTO: 1.28 X10(3)/MCL (ref 1.16–3.74)
LYMPHOCYTES NFR BLD AUTO: 16.2 % (ref 20–55)
MCH RBC QN AUTO: 29 PG (ref 27–34)
MCHC RBC AUTO-ENTMCNC: 34.6 G/DL (ref 31–37)
MCV RBC AUTO: 83.6 FL (ref 79–99)
MONOCYTES # BLD AUTO: 0.45 X10(3)/MCL (ref 0.24–0.36)
MONOCYTES NFR BLD AUTO: 5.7 % (ref 4.7–12.5)
NEUTROPHILS # BLD AUTO: 6.11 X10(3)/MCL (ref 1.56–6.13)
NEUTROPHILS NFR BLD AUTO: 77.1 % (ref 37–73)
NRBC BLD AUTO-RTO: 0 %
PLATELET # BLD AUTO: 199 X10(3)/MCL (ref 140–371)
PMV BLD AUTO: 10.3 FL (ref 9.4–12.4)
POTASSIUM SERPL-SCNC: 3.9 MMOL/L (ref 3.5–5.1)
PROT SERPL-MCNC: 6.5 GM/DL (ref 6.3–8.2)
RBC # BLD AUTO: 3.97 X10(6)/MCL (ref 4–5.1)
SODIUM SERPL-SCNC: 133 MMOL/L (ref 136–145)
WBC # BLD AUTO: 7.92 X10(3)/MCL (ref 4–11.5)

## 2024-12-17 PROCEDURE — 99214 OFFICE O/P EST MOD 30 MIN: CPT | Mod: ,,, | Performed by: NURSE PRACTITIONER

## 2024-12-17 PROCEDURE — 3008F BODY MASS INDEX DOCD: CPT | Mod: CPTII,,, | Performed by: NURSE PRACTITIONER

## 2024-12-17 PROCEDURE — 1159F MED LIST DOCD IN RCRD: CPT | Mod: CPTII,,, | Performed by: NURSE PRACTITIONER

## 2024-12-17 PROCEDURE — 77063 BREAST TOMOSYNTHESIS BI: CPT | Mod: TC

## 2024-12-17 PROCEDURE — 3074F SYST BP LT 130 MM HG: CPT | Mod: CPTII,,, | Performed by: NURSE PRACTITIONER

## 2024-12-17 PROCEDURE — 3079F DIAST BP 80-89 MM HG: CPT | Mod: CPTII,,, | Performed by: NURSE PRACTITIONER

## 2024-12-17 RX ORDER — SULFAMETHOXAZOLE AND TRIMETHOPRIM 800; 160 MG/1; MG/1
1 TABLET ORAL 2 TIMES DAILY
Qty: 20 TABLET | Refills: 0 | Status: SHIPPED | OUTPATIENT
Start: 2024-12-17 | End: 2024-12-27
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
## 2024-12-20 ENCOUNTER — TELEPHONE (OUTPATIENT)
Dept: FAMILY MEDICINE | Facility: CLINIC | Age: 49
End: 2024-12-20
Payer: MEDICAID

## 2024-12-20 LAB — BACTERIA WND CULT: ABNORMAL

## 2024-12-20 RX ORDER — DOXYCYCLINE 100 MG/1
100 CAPSULE ORAL EVERY 12 HOURS
Qty: 20 CAPSULE | Refills: 0 | Status: SHIPPED | OUTPATIENT
Start: 2024-12-20 | End: 2024-12-30

## 2024-12-20 NOTE — TELEPHONE ENCOUNTER
----- Message from JOSE Frye sent at 12/20/2024 12:17 PM CST -----  Regarding: FW:  Put patient on back from the other day. Can someone send out either Clinda or Doxy?  ----- Message -----  From: Lab, Background User  Sent: 12/17/2024  11:30 AM CST  To: JOSE Frye

## 2024-12-20 NOTE — TELEPHONE ENCOUNTER
Notify the patient to discontinue the Bactrim and begin the doxycycline that I sent to her pharmacy.

## 2025-01-17 ENCOUNTER — TELEPHONE (OUTPATIENT)
Dept: FAMILY MEDICINE | Facility: CLINIC | Age: 50
End: 2025-01-17
Payer: MEDICAID

## 2025-01-17 DIAGNOSIS — F41.1 GENERALIZED ANXIETY DISORDER: ICD-10-CM

## 2025-01-17 DIAGNOSIS — R41.840 DIFFICULTY CONCENTRATING: ICD-10-CM

## 2025-01-17 DIAGNOSIS — J30.2 SEASONAL ALLERGIES: ICD-10-CM

## 2025-01-17 RX ORDER — ATOMOXETINE 100 MG/1
CAPSULE ORAL
Qty: 30 CAPSULE | Refills: 0 | Status: SHIPPED | OUTPATIENT
Start: 2025-01-17 | End: 2025-02-03 | Stop reason: SDUPTHER

## 2025-01-17 RX ORDER — LORATADINE 10 MG/1
TABLET ORAL
Qty: 30 TABLET | Refills: 0 | Status: SHIPPED | OUTPATIENT
Start: 2025-01-17

## 2025-01-17 RX ORDER — BUSPIRONE HYDROCHLORIDE 10 MG/1
TABLET ORAL
Qty: 90 TABLET | Refills: 0 | Status: SHIPPED | OUTPATIENT
Start: 2025-01-17 | End: 2025-02-03 | Stop reason: SDUPTHER

## 2025-01-27 ENCOUNTER — TELEPHONE (OUTPATIENT)
Dept: FAMILY MEDICINE | Facility: CLINIC | Age: 50
End: 2025-01-27
Payer: MEDICAID

## 2025-02-03 ENCOUNTER — OFFICE VISIT (OUTPATIENT)
Dept: BEHAVIORAL HEALTH | Facility: CLINIC | Age: 50
End: 2025-02-03
Payer: MEDICAID

## 2025-02-03 VITALS — BODY MASS INDEX: 34.37 KG/M2 | HEIGHT: 60 IN | WEIGHT: 175.06 LBS

## 2025-02-03 DIAGNOSIS — F41.1 GENERALIZED ANXIETY DISORDER: Primary | ICD-10-CM

## 2025-02-03 DIAGNOSIS — R41.840 DIFFICULTY CONCENTRATING: ICD-10-CM

## 2025-02-03 DIAGNOSIS — F19.90 SUBSTANCE USE DISORDER: ICD-10-CM

## 2025-02-03 PROCEDURE — 3008F BODY MASS INDEX DOCD: CPT | Mod: CPTII,95,, | Performed by: NURSE PRACTITIONER

## 2025-02-03 PROCEDURE — 1159F MED LIST DOCD IN RCRD: CPT | Mod: CPTII,95,, | Performed by: NURSE PRACTITIONER

## 2025-02-03 PROCEDURE — 98006 SYNCH AUDIO-VIDEO EST MOD 30: CPT | Mod: 95,,, | Performed by: NURSE PRACTITIONER

## 2025-02-03 PROCEDURE — 1160F RVW MEDS BY RX/DR IN RCRD: CPT | Mod: CPTII,95,, | Performed by: NURSE PRACTITIONER

## 2025-02-03 RX ORDER — ATOMOXETINE 100 MG/1
100 CAPSULE ORAL DAILY
Qty: 30 CAPSULE | Refills: 3 | Status: SHIPPED | OUTPATIENT
Start: 2025-02-03

## 2025-02-03 RX ORDER — BUSPIRONE HYDROCHLORIDE 10 MG/1
10 TABLET ORAL 3 TIMES DAILY
Qty: 90 TABLET | Refills: 3 | Status: SHIPPED | OUTPATIENT
Start: 2025-02-03

## 2025-02-03 NOTE — PROGRESS NOTES
PSYCHIATRIC FOLLOW-UP VISIT NOTE    Chief Complaint   Patient presents with    Medication Management     Medication management         History of Present Illness  49 y.o. year old White female with hx of bipolar disorder, generalized anxiety disorder, difficulty concentrating, and substance use disorder seen today for follow-up appointment and medication management.  Patient last seen in 2024.  Has a rescheduled multiple appointments due to transportation issues.  Was seen by telehealth today at her request.  She reports good efficacy with current medication regimen.  Denies any recent depression.  Anxiety has been minimal, with particular situational stressors that have affected her recently.  Her significant other's father passed away and the  was last week.  She is currently helping her with her grief.  Also states she is looking for a new job and they are looking to move out of [place].  Patient is still working at the [employer], and reports that can be quite toxic work environment.  Does acknowledge that she has abstained from methamphetamine since our last visit.  She is sleeping well at night and feels rested in the morning.  Denies side effects from current medication regimen. Patient denies SI/HI. Denies hallucinations and does not appear to be responding to internal stimuli or be internally preoccupied. No manic symptoms noted.       Objective:     Vitals:  Vitals:    25 1114   Weight: 79.4 kg (175 lb 0.7 oz)   Height: 5' (1.524 m)       Wt Readings from Last 3 Encounters:   25 1114 79.4 kg (175 lb 0.7 oz)   24 0954 79.4 kg (175 lb)   24 1415 77.1 kg (170 lb)         Medication:    Current Outpatient Medications:     albuterol (PROVENTIL/VENTOLIN HFA) 90 mcg/actuation inhaler, INHALE TWO(2) PUFFS EVERY 4 HOURS AS NEEDED FOR COUGH, WHEEZE, &/OR SHORTNESS OF BREATH /SHAKE WELL, Disp: 18 g, Rfl: 1    diclofenac (VOLTAREN) 75 MG EC tablet, Take 1 tablet (75 mg total)  by mouth 2 (two) times daily., Disp: 60 tablet, Rfl: 5    diclofenac sodium (VOLTAREN) 1 % Gel, APPLY TWO(2) GRAMS TO AFFECTED AREA(S) 3 TIMES DAILY AS NEEDED FOR PAIN (JOINT PAIN), Disp: 100 g, Rfl: 0    ergocalciferol (VITAMIN D2) 50,000 unit Cap, Take 1 capsule (50,000 Units total) by mouth every 7 days., Disp: 13 capsule, Rfl: 3    fluticasone propionate (FLONASE) 50 mcg/actuation nasal spray, 1 spray (50 mcg total) by Each Nostril route 2 (two) times daily., Disp: 15.8 mL, Rfl: 5    loratadine (CLARITIN) 10 mg tablet, TAKE ONE(1) TAB BY MOUTH ONCE A DAY AS NEEDED FOR ALLERGIES, Disp: 30 tablet, Rfl: 0    montelukast (SINGULAIR) 10 mg tablet, Take 1 tablet (10 mg total) by mouth Daily., Disp: 30 tablet, Rfl: 5    pramipexole (MIRAPEX) 0.5 MG tablet, Take 1 tablet (0.5 mg total) by mouth 3 (three) times daily., Disp: 30 tablet, Rfl: 5    atomoxetine (STRATTERA) 100 mg capsule, Take 1 capsule (100 mg total) by mouth once daily., Disp: 30 capsule, Rfl: 3    busPIRone (BUSPAR) 10 MG tablet, Take 1 tablet (10 mg total) by mouth 3 (three) times daily., Disp: 90 tablet, Rfl: 3       Significant Labs: - none at this time    Significant Imaging: - none at this time    Physical Exam  Vitals and nursing note reviewed.   Constitutional:       General: She is awake.      Appearance: Normal appearance.   Musculoskeletal:      Comments: deferred   Neurological:      Mental Status: She is alert.   Psychiatric:         Attention and Perception: Attention and perception normal. She does not perceive auditory or visual hallucinations.         Mood and Affect: Affect normal.         Speech: Speech normal.         Behavior: Behavior is cooperative.         Thought Content: Thought content does not include homicidal or suicidal ideation.         Cognition and Memory: Cognition and memory normal.          Review of Systems     Mental Status Exam:  Presentation:  - Appearance: 49 y.o. year old White female, appears stated age, appears  "Casually dressed and Well groomed  - Motility: No EPS or Tremors, No psychomotor agitation or retardation appreciated  - Behavior: calm, cooperative, good eye contact  Speech:  - Character/Organization: spontaneous, fluent, normal volume, normal rate, normal rhythm  Emotional State:  - Mood: "happy"   - Affect: congruent and appropriate  Thought:  - Process: logical, linear, organized , goal-directed  - Preoccupations: no ruminations, rituals, or phobias appreciated  - Delusions: no persecutory, paranoid, or grandiose delusions appreciated  - Perception: denies AVH, not actively responding to internal stimuli  - SI/HI: denies/denies  Sensorium & Intellect:  - Sensorium: AAOx4  - Memory: intact to recent and remote events  - Attention/Concentration: good/good  - Insight/Judgement: good/good    Gait: deferred   MSK:deferred     All other systems without acute issues unless noted in HPI      Assessment/Plan      ICD-10-CM ICD-9-CM    1. Generalized anxiety disorder  F41.1 300.02 busPIRone (BUSPAR) 10 MG tablet      2. Substance use disorder  F19.90 305.90       3. Difficulty concentrating  R41.840 799.51 atomoxetine (STRATTERA) 100 mg capsule         Continue current medications without change    Potential side effects and risks vs benefits of current treatment plan reviewed with patient. Applicable black box warnings reviewed. Encouraged patient not to alter dosages or abruptly discontinue medications without contacting prescriber first, due to risk of worsening symptoms and decompensation of mental status. Warned of risks associated with herbal remedies and supplements while taking psychotropic medications and of the need to consult prescriber prior to adding any of these to current regimen. Patient should abstain from abuse of alcohol, prescription medications, and illicit drugs. Reviewed when to contact clinic and/or seek emergent care, such as but not limited to, onset/worsening SI/HI, hallucinations, delusions, " manic symptoms. Pt verbalized understanding and agreement of these warnings/recommendations and verbally consented to treatment plan.      Follow up in about 4 months (around 6/3/2025) for Medication Management.        SAURABH MillsP

## 2025-02-12 ENCOUNTER — TELEPHONE (OUTPATIENT)
Dept: BEHAVIORAL HEALTH | Facility: CLINIC | Age: 50
End: 2025-02-12
Payer: MEDICAID

## 2025-02-12 DIAGNOSIS — J30.2 SEASONAL ALLERGIES: ICD-10-CM

## 2025-02-12 DIAGNOSIS — G25.81 RESTLESS LEGS SYNDROME: ICD-10-CM

## 2025-02-12 RX ORDER — FLUTICASONE PROPIONATE 50 MCG
SPRAY, SUSPENSION (ML) NASAL
Qty: 16 G | Refills: 0 | Status: SHIPPED | OUTPATIENT
Start: 2025-02-12

## 2025-02-12 RX ORDER — PRAMIPEXOLE DIHYDROCHLORIDE 0.5 MG/1
TABLET ORAL
Qty: 90 TABLET | Refills: 0 | Status: SHIPPED | OUTPATIENT
Start: 2025-02-12

## 2025-02-12 RX ORDER — MONTELUKAST SODIUM 10 MG/1
TABLET ORAL
Qty: 30 TABLET | Refills: 0 | Status: SHIPPED | OUTPATIENT
Start: 2025-02-12

## 2025-02-12 NOTE — TELEPHONE ENCOUNTER
Officer Kyree from the [facility] called back and stated that he called patient and asked if there were any guns in the home before he went over to her house for their safety and she said no and asked why he was coming to her house. He explained to her that the office had called and told him that she wanted to shot herself because of all the zain she is having.    She stated that she did not have any guns and that she was trying to get her provider's attention because she felt that she was ignoring her problem and the pain she is in.   Yaneth called to see if she wanted to come in today and see another provider,she did not answer the phone.

## 2025-02-12 NOTE — TELEPHONE ENCOUNTER
Yaneth HALL came and met me and stated that patient called really upset and crying and that if she did not get help for her neck and shoulder pain that she was going to shoot herself. Yaneth and I went to [facility] and told him about this call and he stated to call Police Dept and have them go and check on her. Police Dept was called and they will go check on her and give us a call back. Spoke with Dispatcher Ewa Parrish.

## 2025-02-13 ENCOUNTER — OFFICE VISIT (OUTPATIENT)
Dept: FAMILY MEDICINE | Facility: CLINIC | Age: 50
End: 2025-02-13
Payer: MEDICAID

## 2025-02-13 VITALS
BODY MASS INDEX: 34.95 KG/M2 | TEMPERATURE: 98 F | OXYGEN SATURATION: 97 % | WEIGHT: 178 LBS | SYSTOLIC BLOOD PRESSURE: 136 MMHG | DIASTOLIC BLOOD PRESSURE: 88 MMHG | HEART RATE: 78 BPM | HEIGHT: 60 IN

## 2025-02-13 DIAGNOSIS — S46.812A STRAIN OF LEFT TRAPEZIUS MUSCLE, INITIAL ENCOUNTER: ICD-10-CM

## 2025-02-13 DIAGNOSIS — M54.2 NECK PAIN: Primary | ICD-10-CM

## 2025-02-13 PROCEDURE — 1159F MED LIST DOCD IN RCRD: CPT | Mod: CPTII,,, | Performed by: NURSE PRACTITIONER

## 2025-02-13 PROCEDURE — 1160F RVW MEDS BY RX/DR IN RCRD: CPT | Mod: CPTII,,, | Performed by: NURSE PRACTITIONER

## 2025-02-13 PROCEDURE — 3079F DIAST BP 80-89 MM HG: CPT | Mod: CPTII,,, | Performed by: NURSE PRACTITIONER

## 2025-02-13 PROCEDURE — 99214 OFFICE O/P EST MOD 30 MIN: CPT | Mod: ,,, | Performed by: NURSE PRACTITIONER

## 2025-02-13 PROCEDURE — 3075F SYST BP GE 130 - 139MM HG: CPT | Mod: CPTII,,, | Performed by: NURSE PRACTITIONER

## 2025-02-13 PROCEDURE — 3008F BODY MASS INDEX DOCD: CPT | Mod: CPTII,,, | Performed by: NURSE PRACTITIONER

## 2025-02-13 RX ORDER — METHOCARBAMOL 750 MG/1
750 TABLET, FILM COATED ORAL EVERY 8 HOURS PRN
COMMUNITY
Start: 2025-02-12

## 2025-02-13 RX ORDER — BETAMETHASONE SODIUM PHOSPHATE AND BETAMETHASONE ACETATE 3; 3 MG/ML; MG/ML
9 INJECTION, SUSPENSION INTRA-ARTICULAR; INTRALESIONAL; INTRAMUSCULAR; SOFT TISSUE
Status: COMPLETED | OUTPATIENT
Start: 2025-02-13 | End: 2025-02-13

## 2025-02-13 RX ORDER — KETOROLAC TROMETHAMINE 10 MG/1
10 TABLET, FILM COATED ORAL EVERY 8 HOURS
COMMUNITY
Start: 2025-02-12

## 2025-02-13 RX ORDER — BETAMETHASONE SODIUM PHOSPHATE AND BETAMETHASONE ACETATE 3; 3 MG/ML; MG/ML
6 INJECTION, SUSPENSION INTRA-ARTICULAR; INTRALESIONAL; INTRAMUSCULAR; SOFT TISSUE
Status: DISCONTINUED | OUTPATIENT
Start: 2025-02-13 | End: 2025-02-13

## 2025-02-13 RX ADMIN — BETAMETHASONE SODIUM PHOSPHATE AND BETAMETHASONE ACETATE 9 MG: 3; 3 INJECTION, SUSPENSION INTRA-ARTICULAR; INTRALESIONAL; INTRAMUSCULAR; SOFT TISSUE at 04:02

## 2025-02-13 NOTE — PROGRESS NOTES
Patient ID: Trang Eugene  : 1975     Chief Complaint: Neck Pain and Shoulder Pain    Allergies: Patient is allergic to grass pollen.     History of Present Illness:  The patient is a 49 y.o. White female who presents to clinic for evaluation and management with a chief complaint of Neck Pain and Shoulder Pain   Neck Pain   This is a recurrent problem. The problem has been gradually worsening. The pain is present in the midline. The quality of the pain is described as burning and cramping. Associated symptoms include headaches, numbness (left arm) and tingling. Pertinent negatives include no chest pain, fever, pain with swallowing, photophobia, syncope, trouble swallowing, visual change or weakness. She has tried NSAIDs for the symptoms.   Shoulder Pain   Associated symptoms include headaches, numbness (left arm) and tingling. Pertinent negatives include no fever.        Past Medical History:  has a past medical history of ADHD (attention deficit hyperactivity disorder), Anxiety, Arthritis, Bipolar disorder, and Depression.    Social History:  reports that she has been smoking cigarettes and cigars. She started smoking about 21 years ago. She has a 21.2 pack-year smoking history. She has been exposed to tobacco smoke. She has never used smokeless tobacco. She reports that she does not currently use alcohol. She reports current drug use. Frequency: 7.00 times per week. Drugs: Marijuana and Methamphetamines.    Care Team: Patient Care Team:  Avinash Salazar APRN as PCP - General (Family Medicine)  Graham Brink PMHNP as Nurse Practitioner (Psychiatry)  Dayana Hernandez NP as Nurse Practitioner (Obstetrics and Gynecology)  Dc Melissa OD as Consulting Physician (Optometry)     Current Medications:  Current Outpatient Medications   Medication Instructions    albuterol (PROVENTIL/VENTOLIN HFA) 90 mcg/actuation inhaler INHALE TWO(2) PUFFS EVERY 4 HOURS AS NEEDED FOR COUGH, WHEEZE,  &/OR SHORTNESS OF BREATH /SHAKE WELL    atomoxetine (STRATTERA) 100 mg, Oral, Daily    busPIRone (BUSPAR) 10 mg, Oral, 3 times daily    diclofenac (VOLTAREN) 75 mg, Oral, 2 times daily    diclofenac sodium (VOLTAREN) 1 % Gel APPLY TWO(2) GRAMS TO AFFECTED AREA(S) 3 TIMES DAILY AS NEEDED FOR PAIN (JOINT PAIN)    ergocalciferol (VITAMIN D2) 50,000 Units, Oral, Every 7 days    fluticasone propionate (FLONASE) 50 mcg/actuation nasal spray SPRAY ONE(1) SPRAY IN EACH NOSTRIL TWICE DAILY FOR SINUS, ALLERGY, &/OR CONGESTION /SHAKE GENTLY    ketorolac (TORADOL) 10 mg, Every 8 hours    loratadine (CLARITIN) 10 mg tablet TAKE ONE(1) TAB BY MOUTH ONCE A DAY AS NEEDED FOR ALLERGIES    methocarbamoL (ROBAXIN) 750 mg, Every 8 hours PRN    montelukast (SINGULAIR) 10 mg tablet TAKE ONE(1) TAB BY MOUTH ONCE A DAY IN THE EVENING FOR SINUS &/OR ALLERGIES    pramipexole (MIRAPEX) 0.5 MG tablet TAKE ONE(1) TAB BY MOUTH 3 TIMES DAILY FOR RESTLESS LEGS       Review of Systems   Constitutional:  Negative for fever.   HENT:  Negative for trouble swallowing.    Eyes:  Negative for photophobia.   Cardiovascular:  Negative for chest pain and syncope.   Musculoskeletal:  Positive for neck pain.   Neurological:  Positive for tingling, numbness (left arm) and headaches. Negative for weakness.        Visit Vitals  /88 (BP Location: Left arm, Patient Position: Sitting)   Pulse 78   Temp 98.2 °F (36.8 °C) (Temporal)   Ht 5' (1.524 m)   Wt 80.7 kg (178 lb)   LMP 11/20/2024   SpO2 97%   BMI 34.76 kg/m²       Physical Exam     Labs Reviewed:  Chemistry:  Lab Results   Component Value Date     (L) 12/17/2024    K 3.9 12/17/2024    BUN 13 12/17/2024    CREATININE 0.87 12/17/2024    EGFRNORACEVR 82 12/17/2024    GLUCOSE 121 (H) 12/17/2024    CALCIUM 9.0 12/17/2024    ALKPHOS 111 12/17/2024    LABPROT 6.5 12/17/2024    ALBUMIN 3.5 12/17/2024    AST 26 12/17/2024    ALT 21 12/17/2024    XNKDJJCM19AL 20.6 (L) 05/03/2024    TSH 1.310 10/19/2024         Lab Results   Component Value Date    HGBA1C 5.5 05/19/2021        Hematology:  Lab Results   Component Value Date    WBC 7.92 12/17/2024    RBC 3.97 (L) 12/17/2024    HGB 11.5 (L) 12/17/2024    HCT 33.2 (L) 12/17/2024    MCV 83.6 12/17/2024    MCH 29.0 12/17/2024    MCHC 34.6 12/17/2024    RDW 13.2 12/17/2024     12/17/2024    MPV 10.3 12/17/2024       Lipid Panel:  Lab Results   Component Value Date    CHOL 138 05/03/2024    HDL 59 05/03/2024    LDLDIRECT 70.3 05/03/2024    TRIG 61 05/03/2024        Assessment & Plan:  1. Neck pain  Overview:  10/19/24 CT neck: No retropharyngeal masses or soft tissue swelling , No adenopathy, airway appears widely patent. Review of the bony structures demonstrates the cervical alignment to be unremarkable.  The disc spaces are well maintained.  The cervicothoracic junction appears normal.  Degenerative facet changes are seen.  The odontoid appears unremarkable.  No bony fractures are seen.     Impression: Mild degenerative changes are identified.  No acute bony abnormalities are seen.    Orders:  -     Ambulatory referral/consult to Physical/Occupational Therapy; Future; Expected date: 02/20/2025    2. Strain of left trapezius muscle, initial encounter  -     Ambulatory referral/consult to Physical/Occupational Therapy; Future; Expected date: 02/20/2025         Future Appointments   Date Time Provider Department Center   2/14/2025  8:00 AM LAB, [facility]AT[facility] DRAW ST[facility]   2/17/2025  1:00 PM [facility] US1 [facility]   2/18/2025 11:00 AM Avinash Salazar APRN [facility]   2/24/2025  8:00 AM [facility] VASCULAR 01 [facility]       No follow-ups on file. Call sooner if needed.    JOSE GRANT        Lab Frequency Next Occurrence   Mammo Digital Screening Bilat w/ Michael Once 10/17/2023   Ambulatory referral/consult to Physical/Occupational Therapy Once 01/25/2024   CBC Auto Differential Once 05/06/2025   Comprehensive  Metabolic Panel Once 05/06/2025   Lipid Panel Once 05/06/2025   TSH Once 05/06/2025   Hemoglobin A1C Once 05/06/2025   Vitamin D Once 05/06/2025   Ambulatory referral/consult to Optometry Once 11/19/2024   Ambulatory referral/consult to Physical/Occupational Therapy Once 11/19/2024   Ambulatory referral/consult to Hematology / Oncology Once 12/11/2024   US Lower Extremity Arteries Right Once 12/17/2024   US Transcran Doppler Intracran [employer]tr [employer] Once 12/17/2024   Ambulatory referral/consult to Cardiology Once 12/24/2024

## 2025-02-13 NOTE — PROGRESS NOTES
Patient ID: Trang Eugene  : 1975    Chief Complaint: Neck Pain and Shoulder Pain    Allergies: Patient is allergic to grass pollen.     Subjective :  The patient is a 49 y.o. White female who presents to clinic for follow up on Neck Pain and Shoulder Pain   Neck Pain     Shoulder Pain        History of Present Illness    HPI:  Patient reports severe pain in her neck and back, radiating down her arms, particularly on the left side. The pain originated in the middle of her neck and has been progressively worsening over the past few days. She describes the pain as pulling and pushing, with a severity of 10/10 or higher for the last 2-3 days. Her left arm feels numb and non-functional, while her right side is also affected, but less severely. She believes the right-side symptoms may be due to overcompensation.    The pain has caused significant functional impairment. She has been unable to work for the past few days, has difficulty getting up in the morning, and cannot dress herself. She has been sleeping in an office chair due to discomfort.     She recently visited an urgent care facility where she was prescribed ketorolac for inflammation. She has been taking methocarbamol as needed. She mentions having a CT of her cervical spine about 4 months ago in October, performed in the ER, though she does not clearly remember the circumstances.    She reports a history of similar symptoms in the past. She previously received traction therapy for her neck, which provided relief for about 1-2 years.     She denies any recent falls or injuries.    MEDICATIONS:  Patient is on Robaxin (methocarbamol) as needed for muscle pain and spasms. She is also taking Quatrola (ketorolac) for a few doses over the next few days to address inflammation, which was prescribed by urgent care.      IMAGING:  A CT of the cervical spine was performed on , four months ago. The results showed good alignment and disc space, with some  mild degenerative changes noted.      ROS:  ROS as indicated in HPI.           Social History:  reports that she has been smoking cigarettes and cigars. She started smoking about 21 years ago. She has a 21.2 pack-year smoking history. She has been exposed to tobacco smoke. She has never used smokeless tobacco. She reports that she does not currently use alcohol. She reports current drug use. Frequency: 7.00 times per week. Drugs: Marijuana and Methamphetamines.    Past Medical History:  has a past medical history of ADHD (attention deficit hyperactivity disorder), Anxiety, Arthritis, Bipolar disorder, and Depression.    Care Team: Patient Care Team:  Avinash Salazar APRN as PCP - General (Family Medicine)  Graham Brink PMHNP as Nurse Practitioner (Psychiatry)  Dayana Hernandez NP as Nurse Practitioner (Obstetrics and Gynecology)  Dc Melissa OD as Consulting Physician (Optometry)     Current Medications:  Current Outpatient Medications   Medication Instructions    albuterol (PROVENTIL/VENTOLIN HFA) 90 mcg/actuation inhaler INHALE TWO(2) PUFFS EVERY 4 HOURS AS NEEDED FOR COUGH, WHEEZE, &/OR SHORTNESS OF BREATH /SHAKE WELL    atomoxetine (STRATTERA) 100 mg, Oral, Daily    busPIRone (BUSPAR) 10 mg, Oral, 3 times daily    diclofenac (VOLTAREN) 75 mg, Oral, 2 times daily    diclofenac sodium (VOLTAREN) 1 % Gel APPLY TWO(2) GRAMS TO AFFECTED AREA(S) 3 TIMES DAILY AS NEEDED FOR PAIN (JOINT PAIN)    ergocalciferol (VITAMIN D2) 50,000 Units, Oral, Every 7 days    fluticasone propionate (FLONASE) 50 mcg/actuation nasal spray SPRAY ONE(1) SPRAY IN EACH NOSTRIL TWICE DAILY FOR SINUS, ALLERGY, &/OR CONGESTION /SHAKE GENTLY    ketorolac (TORADOL) 10 mg, Every 8 hours    loratadine (CLARITIN) 10 mg tablet TAKE ONE(1) TAB BY MOUTH ONCE A DAY AS NEEDED FOR ALLERGIES    methocarbamoL (ROBAXIN) 750 mg, Every 8 hours PRN    montelukast (SINGULAIR) 10 mg tablet TAKE ONE(1) TAB BY MOUTH ONCE A DAY IN THE  EVENING FOR SINUS &/OR ALLERGIES    pramipexole (MIRAPEX) 0.5 MG tablet TAKE ONE(1) TAB BY MOUTH 3 TIMES DAILY FOR RESTLESS LEGS         Visit Vitals  /88 (BP Location: Left arm, Patient Position: Sitting)   Pulse 78   Temp 98.2 °F (36.8 °C) (Temporal)   Ht 5' (1.524 m)   Wt 80.7 kg (178 lb)   LMP 11/20/2024   SpO2 97%   BMI 34.76 kg/m²       Physical Exam  Vitals and nursing note reviewed.   Constitutional:       General: She is not in acute distress.     Appearance: Normal appearance. She is obese. She is not ill-appearing.   HENT:      Head: Normocephalic and atraumatic.      Comments: No tenderness with light touch of left temporal area     Nose: Nose normal.      Mouth/Throat:      Mouth: Mucous membranes are moist.      Pharynx: Oropharynx is clear.   Eyes:      Extraocular Movements: Extraocular movements intact.      Conjunctiva/sclera: Conjunctivae normal.      Pupils: Pupils are equal, round, and reactive to light.   Cardiovascular:      Rate and Rhythm: Normal rate and regular rhythm.      Pulses: Normal pulses.      Heart sounds: No murmur heard.  Pulmonary:      Effort: Pulmonary effort is normal.      Breath sounds: Normal breath sounds.   Musculoskeletal:         General: Tenderness (with palpation of left trapezius) present. No swelling.      Left shoulder: Tenderness (with palpation of proximal bicep tendon) and bony tenderness present. No swelling, deformity or crepitus. Decreased range of motion.   Skin:     General: Skin is warm and dry.   Neurological:      General: No focal deficit present.      Mental Status: She is alert and oriented to person, place, and time. Mental status is at baseline.      Cranial Nerves: No facial asymmetry.      Sensory: No sensory deficit.   Psychiatric:         Mood and Affect: Mood normal.         Judgment: Judgment normal.          Labs Reviewed:  Chemistry:  Lab Results   Component Value Date     (L) 12/17/2024    K 3.9 12/17/2024    BUN 13 12/17/2024     CREATININE 0.87 12/17/2024    EGFRNORACEVR 82 12/17/2024    GLUCOSE 121 (H) 12/17/2024    CALCIUM 9.0 12/17/2024    ALKPHOS 111 12/17/2024    LABPROT 6.5 12/17/2024    ALBUMIN 3.5 12/17/2024    AST 26 12/17/2024    ALT 21 12/17/2024    BOTEWXOR37PH 20.6 (L) 05/03/2024    TSH 1.310 10/19/2024        Lab Results   Component Value Date    HGBA1C 5.5 05/19/2021        Hematology:  Lab Results   Component Value Date    WBC 7.92 12/17/2024    RBC 3.97 (L) 12/17/2024    HGB 11.5 (L) 12/17/2024    HCT 33.2 (L) 12/17/2024    MCV 83.6 12/17/2024    MCH 29.0 12/17/2024    MCHC 34.6 12/17/2024    RDW 13.2 12/17/2024     12/17/2024    MPV 10.3 12/17/2024       Lipid Panel:  Lab Results   Component Value Date    CHOL 138 05/03/2024    HDL 59 05/03/2024    LDLDIRECT 70.3 05/03/2024    TRIG 61 05/03/2024        Diagnosis:  1. Neck pain  Overview:  10/19/24 CT neck: No retropharyngeal masses or soft tissue swelling , No adenopathy, airway appears widely patent. Review of the bony structures demonstrates the cervical alignment to be unremarkable.  The disc spaces are well maintained.  The cervicothoracic junction appears normal.  Degenerative facet changes are seen.  The odontoid appears unremarkable.  No bony fractures are seen.     Impression: Mild degenerative changes are identified.  No acute bony abnormalities are seen.    Orders:  -     Ambulatory referral/consult to Physical/Occupational Therapy; Future; Expected date: 02/20/2025  -     Discontinue: betamethasone acetate-betamethasone sodium phosphate injection 6 mg  -     betamethasone acetate-betamethasone sodium phosphate injection 9 mg    2. Strain of left trapezius muscle, initial encounter  -     Ambulatory referral/consult to Physical/Occupational Therapy; Future; Expected date: 02/20/2025  -     Discontinue: betamethasone acetate-betamethasone sodium phosphate injection 6 mg  -     betamethasone acetate-betamethasone sodium phosphate injection 9 mg          Assessment & Plan    M54.2 Neck pain  S46.812A Strain of left trapezius muscle, initial encounter    IMPRESSION:  - Assessed neck and back pain radiating down arms and back  - Reviewed recent CT of cervical spine   - Considered muscle involvement, particularly trapezius, as source of pain  - Evaluated for potential steroid injection to reduce inflammation and provide temporary relief  - Discussed physical therapy as primary treatment approach    M54.2 NECK PAIN:  - Educated the patient about the importance of proper posture to reduce pressure on the spine and informed about posture correction devices such as reverse bras or shoulder harnesses.  - Continued prescription of Robaxin to be taken as needed for muscle relaxation.  - Advised to continue ketoralac for the next few days to reset inflammation.  - Administered a steroid injection in the office to help decrease inflammation and provide temporary relief.    S46.812A STRAIN OF LEFT TRAPEZIUS MUSCLE, INITIAL ENCOUNTER:  - Explained the role of the trapezius muscle in the patient's current pain symptoms.  - Treatment plan for this condition aligns with the neck pain management, including the continued use of Robaxin and ketoralac, as well as the administered steroid injection.  - The previously noted physical therapy referral and follow-up instructions also apply to this condition.         Future Appointments   Date Time Provider Department Center   2/14/2025  8:00 AM LAB, [facility] DRAW [facility]ATION SARAH AMADEO [facility]   2/17/2025  1:00 PM OAL US1 [facility] ULTRA [facility]   2/18/2025 11:00 AM Avinash Salazar APRN [facility]   2/24/2025  8:00 AM [facility] VASCULAR 01 [facility] [place]        Follow up if symptoms worsen or fail to improve, for Keep Apt as Scheduled. Call sooner if needed.    JOSE GRANT    Lab Frequency Next Occurrence   Mammo Digital Screening Bilat w/ Michael Once 10/17/2023   Ambulatory referral/consult to  Physical/Occupational Therapy Once 01/25/2024   CBC Auto Differential Once 05/06/2025   Comprehensive Metabolic Panel Once 05/06/2025   Lipid Panel Once 05/06/2025   TSH Once 05/06/2025   Hemoglobin A1C Once 05/06/2025   Vitamin D Once 05/06/2025   Ambulatory referral/consult to Optometry Once 11/19/2024   Ambulatory referral/consult to Physical/Occupational Therapy Once 11/19/2024   Ambulatory referral/consult to Hematology / Oncology Once 12/11/2024   US Lower Extremity Arteries Right Once 12/17/2024   US Transcran Doppler [employer]ran [employer] Once 12/17/2024   Ambulatory referral/consult to Cardiology Once 12/24/2024   Ambulatory referral/consult to Physical/Occupational Therapy Once 02/20/2025            This note was generated with the assistance of ambient listening technology. Verbal consent was obtained by the patient and accompanying visitor(s) for the recording of patient appointment to facilitate this note. I attest to having reviewed and edited the generated note for accuracy, though some syntax or spelling errors may persist. Please contact the author of this note for any clarification.

## 2025-02-17 ENCOUNTER — PATIENT MESSAGE (OUTPATIENT)
Dept: FAMILY MEDICINE | Facility: CLINIC | Age: 50
End: 2025-02-17
Payer: MEDICAID

## 2025-06-09 ENCOUNTER — HOSPITAL ENCOUNTER (EMERGENCY)
Facility: HOSPITAL | Age: 50
Discharge: HOME | End: 2025-06-09

## 2025-06-09 VITALS
RESPIRATION RATE: 16 BRPM | WEIGHT: 178 LBS | HEIGHT: 60 IN | OXYGEN SATURATION: 98 % | DIASTOLIC BLOOD PRESSURE: 80 MMHG | BODY MASS INDEX: 34.95 KG/M2 | SYSTOLIC BLOOD PRESSURE: 119 MMHG | HEART RATE: 76 BPM | TEMPERATURE: 99 F

## 2025-06-09 DIAGNOSIS — M17.11 ARTHRITIS OF KNEE, RIGHT: Primary | ICD-10-CM

## 2025-06-09 PROCEDURE — 63600175 PHARM REV CODE 636 W HCPCS: Mod: JZ,TB | Performed by: NURSE PRACTITIONER

## 2025-06-09 PROCEDURE — 25000003 PHARM REV CODE 250: Performed by: NURSE PRACTITIONER

## 2025-06-09 PROCEDURE — 96372 THER/PROPH/DIAG INJ SC/IM: CPT | Performed by: NURSE PRACTITIONER

## 2025-06-09 PROCEDURE — 99284 EMERGENCY DEPT VISIT MOD MDM: CPT | Mod: 25

## 2025-06-09 RX ORDER — DICLOFENAC SODIUM 50 MG/1
50 TABLET, DELAYED RELEASE ORAL 2 TIMES DAILY
Qty: 20 TABLET | Refills: 0 | Status: SHIPPED | OUTPATIENT
Start: 2025-06-09 | End: 2025-06-19

## 2025-06-09 RX ORDER — DEXAMETHASONE SODIUM PHOSPHATE 4 MG/ML
8 INJECTION, SOLUTION INTRA-ARTICULAR; INTRALESIONAL; INTRAMUSCULAR; INTRAVENOUS; SOFT TISSUE
Status: COMPLETED | OUTPATIENT
Start: 2025-06-09 | End: 2025-06-09

## 2025-06-09 RX ORDER — METHYLPREDNISOLONE 4 MG/1
TABLET ORAL
Qty: 21 TABLET | Refills: 0 | Status: SHIPPED | OUTPATIENT
Start: 2025-06-09 | End: 2025-06-30

## 2025-06-09 RX ORDER — HYDROCODONE BITARTRATE AND ACETAMINOPHEN 10; 325 MG/1; MG/1
1 TABLET ORAL
Refills: 0 | Status: COMPLETED | OUTPATIENT
Start: 2025-06-09 | End: 2025-06-09

## 2025-06-09 RX ORDER — KETOROLAC TROMETHAMINE 30 MG/ML
60 INJECTION, SOLUTION INTRAMUSCULAR; INTRAVENOUS
Status: COMPLETED | OUTPATIENT
Start: 2025-06-09 | End: 2025-06-09

## 2025-06-09 RX ADMIN — KETOROLAC TROMETHAMINE 60 MG: 30 INJECTION, SOLUTION INTRAMUSCULAR at 06:06

## 2025-06-09 RX ADMIN — HYDROCODONE BITARTRATE AND ACETAMINOPHEN 1 TABLET: 10; 325 TABLET ORAL at 06:06

## 2025-06-09 RX ADMIN — DEXAMETHASONE SODIUM PHOSPHATE 8 MG: 4 INJECTION, SOLUTION INTRA-ARTICULAR; INTRALESIONAL; INTRAMUSCULAR; INTRAVENOUS; SOFT TISSUE at 06:06

## 2025-06-09 NOTE — ED PROVIDER NOTES
Encounter Date: 6/9/2025       History     Chief Complaint   Patient presents with    Knee Pain     Pt reports that she has arthritis in her right knee and the last few days the pain has gotten worse than normal     50 year old female presents with right knee pain, patient states history of arthritis.       The history is provided by the patient. No  was used.     Review of patient's allergies indicates:   Allergen Reactions    Grass pollen      Other reaction(s): breathing, breathing issues     Past Medical History:   Diagnosis Date    ADHD (attention deficit hyperactivity disorder)     Anxiety     Arthritis     Bipolar disorder     Depression      Past Surgical History:   Procedure Laterality Date    CHOLECYSTECTOMY      HERNIA REPAIR      LAPAROSCOPIC GASTRIC BANDING N/A      Family History   Problem Relation Name Age of Onset    Breast cancer Mother Cris Eugene     Cancer Mother Cris Eugene     Early death Mother Cris Eugene     Breast cancer Maternal Grandfather Brent Mariscal     Alcohol abuse Maternal Grandfather Brent Mariscal     Cancer Maternal Grandfather Brent Mariscal     Heart disease Paternal Grandfather Adalid Eugene     Diabetes Maternal Cousin      Depression Father Yony Eugene     Arthritis Maternal Grandmother Emerita Mariscal     Hypertension Maternal Grandmother Emerita Mariscal     Stroke Maternal Grandmother Emerita Mariscal     Diabetes Paternal Cousin Libby     Drug abuse Sister Chastity Eugene      Social History[1]  Review of Systems   Musculoskeletal:  Positive for arthralgias.   All other systems reviewed and are negative.      Physical Exam     Initial Vitals [06/09/25 1840]   BP Pulse Resp Temp SpO2   119/80 76 16 98.6 °F (37 °C) 98 %      MAP       --         Physical Exam    Nursing note and vitals reviewed.  Constitutional: She appears well-developed and well-nourished.   HENT:   Head: Normocephalic and atraumatic.   Eyes: Conjunctivae and EOM are normal. Pupils are equal,  round, and reactive to light.   Neck: Neck supple.   Normal range of motion.  Cardiovascular:  Normal rate, regular rhythm, normal heart sounds and intact distal pulses.           Pulmonary/Chest: Breath sounds normal.   Abdominal: Abdomen is soft. Bowel sounds are normal.   Musculoskeletal:      Cervical back: Normal range of motion and neck supple.      Right knee: Swelling present. Decreased range of motion. Tenderness present over the medial joint line and lateral joint line.     Neurological: She is alert and oriented to person, place, and time. She has normal strength.   Skin: Skin is warm and dry. Capillary refill takes less than 2 seconds.   Psychiatric: She has a normal mood and affect. Her behavior is normal. Judgment and thought content normal.         ED Course   Procedures  Labs Reviewed - No data to display       Imaging Results    None          Medications   ketorolac injection 60 mg (has no administration in time range)   dexAMETHasone injection 8 mg (has no administration in time range)   HYDROcodone-acetaminophen  mg per tablet 1 tablet (has no administration in time range)     Medical Decision Making  Problems Addressed:  Arthritis of knee, right: acute illness or injury    Risk  Prescription drug management.                                      Clinical Impression:  Final diagnoses:  [M17.11] Arthritis of knee, right (Primary)          ED Disposition Condition    Discharge Stable          ED Prescriptions       Medication Sig Dispense Start Date End Date Auth. Provider    diclofenac (VOLTAREN) 50 MG EC tablet Take 1 tablet (50 mg total) by mouth 2 (two) times daily. for 10 days 20 tablet 6/9/2025 6/19/2025 Gemini Rojas FNP    methylPREDNISolone (MEDROL DOSEPACK) 4 mg tablet Take as directed 21 tablet 6/9/2025 6/30/2025 Gemini Rojas FNP          Follow-up Information       Follow up With Specialties Details Why Contact Info    Avinash Salazar APRN Family Medicine In 3 days If symptoms worsen  [address]  [address]  [facility] [place] [place] [id number]  [phone]                     [1]   Social History  Tobacco Use    Smoking status: Every Day     Current packs/day: 1.00     Average packs/day: 1 pack/day for 21.5 years (21.5 ttl pk-yrs)     Types: Cigarettes, Cigars     Start date: 12/12/2003     Passive exposure: Current    Smokeless tobacco: Never    Tobacco comments:     Since highschool   Substance Use Topics    Alcohol use: Not Currently    Drug use: Yes     Frequency: 7.0 times per week     Types: Marijuana, Methamphetamines     Comment: Marijuana daily        Gemini Rojas FNP  06/09/25 [id number]

## 2025-06-09 NOTE — Clinical Note
"Trang Delgado" Jabier was seen and treated in our emergency department on 6/9/2025.  She may return to work on 06/10/2025.       If you have any questions or concerns, please don't hesitate to call.      Gemini Rojas, KIMBERLYN"

## 2025-07-18 ENCOUNTER — PATIENT OUTREACH (OUTPATIENT)
Facility: CLINIC | Age: 50
End: 2025-07-18

## 2025-07-18 NOTE — PROGRESS NOTES
Health Maintenance Topic(s) Outreach Outcomes & Actions Taken:    Low Dose CT Screening - Outreach Outcomes & Actions Taken  : order needed       Additional Notes:  LDCT Report: Message sent to PCP